# Patient Record
Sex: FEMALE | Race: WHITE | Employment: OTHER | RURAL
[De-identification: names, ages, dates, MRNs, and addresses within clinical notes are randomized per-mention and may not be internally consistent; named-entity substitution may affect disease eponyms.]

---

## 2020-05-18 PROBLEM — Z79.4 TYPE 2 DIABETES MELLITUS WITH HYPERGLYCEMIA, WITH LONG-TERM CURRENT USE OF INSULIN (HCC): Status: ACTIVE | Noted: 2020-05-18

## 2020-05-18 PROBLEM — E72.12 MTHFR (METHYLENE THF REDUCTASE) DEFICIENCY AND HOMOCYSTINURIA (HCC): Status: ACTIVE | Noted: 2020-05-18

## 2020-05-18 PROBLEM — M75.111 INCOMPLETE TEAR OF RIGHT ROTATOR CUFF: Status: ACTIVE | Noted: 2020-05-18

## 2020-05-18 PROBLEM — E72.11 MTHFR (METHYLENE THF REDUCTASE) DEFICIENCY AND HOMOCYSTINURIA (HCC): Status: ACTIVE | Noted: 2020-05-18

## 2020-05-18 PROBLEM — F51.01 PRIMARY INSOMNIA: Status: ACTIVE | Noted: 2020-05-18

## 2020-05-18 PROBLEM — E11.65 TYPE 2 DIABETES MELLITUS WITH HYPERGLYCEMIA, WITH LONG-TERM CURRENT USE OF INSULIN (HCC): Status: ACTIVE | Noted: 2020-05-18

## 2020-05-18 PROBLEM — E89.0 POSTOPERATIVE HYPOTHYROIDISM: Status: ACTIVE | Noted: 2020-05-18

## 2020-05-18 PROBLEM — Z86.711 HISTORY OF PULMONARY EMBOLISM: Chronic | Status: ACTIVE | Noted: 2020-05-18

## 2020-09-25 PROBLEM — R61 DIAPHORESIS: Status: ACTIVE | Noted: 2020-09-25

## 2020-09-25 PROBLEM — L98.9 SKIN LESION OF FACE: Status: ACTIVE | Noted: 2020-09-25

## 2020-09-25 PROBLEM — Z86.711 HISTORY OF PULMONARY EMBOLISM: Status: ACTIVE | Noted: 2020-09-25

## 2020-09-25 PROBLEM — J32.9 CHRONIC SINUSITIS: Status: ACTIVE | Noted: 2020-09-25

## 2020-10-01 PROBLEM — R80.9 MICROALBUMINURIA: Status: ACTIVE | Noted: 2020-10-01

## 2020-10-01 PROBLEM — E78.2 MIXED HYPERLIPIDEMIA: Status: ACTIVE | Noted: 2020-10-01

## 2020-12-17 ENCOUNTER — OFFICE VISIT (OUTPATIENT)
Dept: PRIMARY CARE CLINIC | Age: 65
End: 2020-12-17

## 2020-12-17 DIAGNOSIS — Z20.822 ENCOUNTER FOR LABORATORY TESTING FOR COVID-19 VIRUS: Primary | ICD-10-CM

## 2020-12-17 PROBLEM — E11.21 TYPE 2 DIABETES WITH NEPHROPATHY (HCC): Status: ACTIVE | Noted: 2020-12-17

## 2020-12-17 NOTE — PROGRESS NOTES
Pt presents to the flu clinic today requesting a covid test. Pt denies symptoms but needs to be screened prior to upcoming travel. Patient declined to be seen by a doctor today.  CANDI

## 2020-12-19 LAB — SARS-COV-2, NAA: NOT DETECTED

## 2021-01-27 PROBLEM — M50.30 DDD (DEGENERATIVE DISC DISEASE), CERVICAL: Status: ACTIVE | Noted: 2021-01-27

## 2021-02-03 PROBLEM — M85.859 OSTEOPENIA OF NECK OF FEMUR: Status: ACTIVE | Noted: 2021-02-03

## 2021-07-14 DIAGNOSIS — F51.01 PRIMARY INSOMNIA: ICD-10-CM

## 2021-07-14 RX ORDER — CYCLOBENZAPRINE HCL 10 MG
TABLET ORAL
Qty: 90 TABLET | Refills: 1 | Status: SHIPPED | OUTPATIENT
Start: 2021-07-14 | End: 2021-10-20

## 2021-07-14 RX ORDER — ZOLPIDEM TARTRATE 5 MG/1
5 TABLET ORAL
Qty: 30 TABLET | Refills: 0 | Status: SHIPPED | OUTPATIENT
Start: 2021-07-19 | End: 2021-08-14

## 2021-07-14 RX ORDER — ATORVASTATIN CALCIUM 40 MG/1
TABLET, FILM COATED ORAL
Qty: 90 TABLET | Refills: 3 | Status: SHIPPED | OUTPATIENT
Start: 2021-07-14

## 2021-07-14 RX ORDER — LEVOTHYROXINE SODIUM 112 UG/1
TABLET ORAL
Qty: 90 TABLET | Refills: 1 | Status: SHIPPED | OUTPATIENT
Start: 2021-07-14 | End: 2022-01-11

## 2021-07-14 NOTE — TELEPHONE ENCOUNTER
Pt has a appt on 7/29 but only has a weeks worth of meds remaining.  Could we call I enough to get her to her appt    [facility]    Levothyroxine 112  Zolpidem 5  Atorvastatin 40  Cyclobenzaprine 10

## 2021-07-14 NOTE — TELEPHONE ENCOUNTER
Meds refilled.  checked. Ambien last filled 4-22 for a 90 days supply.  New script sent for #[id number],[id number] with future fill date 7-19-21

## 2021-07-29 ENCOUNTER — OFFICE VISIT (OUTPATIENT)
Dept: FAMILY MEDICINE CLINIC | Age: 66
End: 2021-07-29
Payer: MEDICARE

## 2021-07-29 VITALS
SYSTOLIC BLOOD PRESSURE: 116 MMHG | OXYGEN SATURATION: 100 % | WEIGHT: 209.13 LBS | DIASTOLIC BLOOD PRESSURE: 69 MMHG | TEMPERATURE: 98.3 F | BODY MASS INDEX: 30.97 KG/M2 | RESPIRATION RATE: 18 BRPM | HEART RATE: 81 BPM | HEIGHT: 69 IN

## 2021-07-29 DIAGNOSIS — R10.30 LOWER ABDOMINAL PAIN: Primary | ICD-10-CM

## 2021-07-29 DIAGNOSIS — F51.04 PSYCHOPHYSIOLOGICAL INSOMNIA: ICD-10-CM

## 2021-07-29 DIAGNOSIS — E78.2 MIXED HYPERLIPIDEMIA: ICD-10-CM

## 2021-07-29 DIAGNOSIS — N30.00 ACUTE CYSTITIS WITHOUT HEMATURIA: ICD-10-CM

## 2021-07-29 DIAGNOSIS — E72.12 MTHFR (METHYLENE THF REDUCTASE) DEFICIENCY AND HOMOCYSTINURIA (HCC): ICD-10-CM

## 2021-07-29 DIAGNOSIS — M12.811 ROTATOR CUFF TEAR ARTHROPATHY OF BOTH SHOULDERS: ICD-10-CM

## 2021-07-29 DIAGNOSIS — E89.0 POSTOPERATIVE HYPOTHYROIDISM: ICD-10-CM

## 2021-07-29 DIAGNOSIS — M75.101 ROTATOR CUFF TEAR ARTHROPATHY OF BOTH SHOULDERS: ICD-10-CM

## 2021-07-29 DIAGNOSIS — E72.11 MTHFR (METHYLENE THF REDUCTASE) DEFICIENCY AND HOMOCYSTINURIA (HCC): ICD-10-CM

## 2021-07-29 DIAGNOSIS — M50.30 DDD (DEGENERATIVE DISC DISEASE), CERVICAL: ICD-10-CM

## 2021-07-29 DIAGNOSIS — Z91.199 NON COMPLIANCE WITH MEDICAL TREATMENT: ICD-10-CM

## 2021-07-29 DIAGNOSIS — Z12.11 SCREENING FOR COLON CANCER: ICD-10-CM

## 2021-07-29 DIAGNOSIS — M12.812 ROTATOR CUFF TEAR ARTHROPATHY OF BOTH SHOULDERS: ICD-10-CM

## 2021-07-29 DIAGNOSIS — M75.102 ROTATOR CUFF TEAR ARTHROPATHY OF BOTH SHOULDERS: ICD-10-CM

## 2021-07-29 DIAGNOSIS — E11.21 TYPE 2 DIABETES WITH NEPHROPATHY (HCC): ICD-10-CM

## 2021-07-29 LAB
BILIRUB UR QL STRIP: NEGATIVE
GLUCOSE UR-MCNC: NORMAL MG/DL
KETONES P FAST UR STRIP-MCNC: NORMAL MG/DL
PH UR STRIP: 5 [PH] (ref 4.6–8)
PROT UR QL STRIP: NORMAL
SP GR UR STRIP: 1.03 (ref 1–1.03)
UA UROBILINOGEN AMB POC: NORMAL (ref 0.2–1)
URINALYSIS CLARITY POC: CLEAR
URINALYSIS COLOR POC: YELLOW
URINE BLOOD POC: NEGATIVE
URINE LEUKOCYTES POC: NEGATIVE
URINE NITRITES POC: POSITIVE

## 2021-07-29 PROCEDURE — G8432 DEP SCR NOT DOC, RNG: HCPCS | Performed by: NURSE PRACTITIONER

## 2021-07-29 PROCEDURE — G8427 DOCREV CUR MEDS BY ELIG CLIN: HCPCS | Performed by: NURSE PRACTITIONER

## 2021-07-29 PROCEDURE — 3017F COLORECTAL CA SCREEN DOC REV: CPT | Performed by: NURSE PRACTITIONER

## 2021-07-29 PROCEDURE — 2022F DILAT RTA XM EVC RTNOPTHY: CPT | Performed by: NURSE PRACTITIONER

## 2021-07-29 PROCEDURE — G8536 NO DOC ELDER MAL SCRN: HCPCS | Performed by: NURSE PRACTITIONER

## 2021-07-29 PROCEDURE — 3046F HEMOGLOBIN A1C LEVEL >9.0%: CPT | Performed by: NURSE PRACTITIONER

## 2021-07-29 PROCEDURE — 1090F PRES/ABSN URINE INCON ASSESS: CPT | Performed by: NURSE PRACTITIONER

## 2021-07-29 PROCEDURE — G8399 PT W/DXA RESULTS DOCUMENT: HCPCS | Performed by: NURSE PRACTITIONER

## 2021-07-29 PROCEDURE — G9899 SCRN MAM PERF RSLTS DOC: HCPCS | Performed by: NURSE PRACTITIONER

## 2021-07-29 PROCEDURE — 1101F PT FALLS ASSESS-DOCD LE1/YR: CPT | Performed by: NURSE PRACTITIONER

## 2021-07-29 PROCEDURE — G8417 CALC BMI ABV UP PARAM F/U: HCPCS | Performed by: NURSE PRACTITIONER

## 2021-07-29 PROCEDURE — 36415 COLL VENOUS BLD VENIPUNCTURE: CPT | Performed by: NURSE PRACTITIONER

## 2021-07-29 PROCEDURE — 99214 OFFICE O/P EST MOD 30 MIN: CPT | Performed by: NURSE PRACTITIONER

## 2021-07-29 RX ORDER — FLASH GLUCOSE SENSOR
KIT MISCELLANEOUS
Qty: 1 KIT | Refills: 5 | Status: SHIPPED | OUTPATIENT
Start: 2021-07-29 | End: 2021-08-01 | Stop reason: ALTCHOICE

## 2021-07-29 RX ORDER — NITROFURANTOIN 25; 75 MG/1; MG/1
100 CAPSULE ORAL 2 TIMES DAILY
Qty: 14 CAPSULE | Refills: 0 | Status: SHIPPED | OUTPATIENT
Start: 2021-07-29 | End: 2021-08-05

## 2021-07-29 RX ORDER — INSULIN GLARGINE 100 [IU]/ML
INJECTION, SOLUTION SUBCUTANEOUS
Qty: 15 PEN | Refills: 2 | Status: SHIPPED | OUTPATIENT
Start: 2021-07-29 | End: 2021-08-10

## 2021-07-29 NOTE — PROGRESS NOTES
Subjective:     CC: diabetes, follow up    HPI  This is a 6 month follow up for diabetes and other chronic medical problems. She is a retired  from [place]. New issues: States she is having bilateral lower abdominal pain. Started a week or so ago. Denies dysuria, urinary frequency or urgency. Denies hematuria. Denies diarrhea or constipation. She believes she has ovarian cysts and would like an ultrasound. Type 2 diabetes  Lab Results   Component Value Date/Time    Hemoglobin A1c 8.9 (H) 09/24/2020 10:07 AM     She is on Basaglar insulin 46 units q HS. She also has insulin lispro prescribed to take TID qAC according to SS. States she has not had to use this much. Also on Metformin 1000mg BID. Januvia and Jardiance gave her horrible yeast infections. 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 At one time she brought her A1C down to 6% from 10% with the Keto diet. Unfortunately she fell off track with her diet. Says the "[employer] works great, it will alert her if her sugar drops- this happened only 2-3 times when she did not eat enough. Was referred to Endo months ago but never made an appt. Microalbuminuria  At the last visit her microalbuminuria was elevated so she was started on a low dose of Lisinopril. Creatinine was normal. She tolerates the Lisinopril well. BP at goal. She has no HTN. HLD  At her last appt 3 months ago her total cholesterol was 258, . She was instructed to increase Lipitor from 40mg to 80mg but it gave her leg cramps. She is now back on 40mg, still has leg cramps but if she drinks something they will go away. Acquired hypothyroidism  Lab Results   Component Value Date/Time    TSH 1.900 12/17/2020 10:29 AM     She had a thyroidectomy due to pre-cancerous cells after taking Victoza. Did not require chemo or radiation. Now on Synthroid 112mcg daily. Denies fatigue or weight changes.     Insomnia  Her previous provider had her on Ambien 5mg qHS prn which worked well but she was concerned about the long term effects. She tried trazodone but had side effects. Bernice Cleary was not covered by insurance so she is now back on Ambien. Sometimes she will skip it and use Benadryl instead. Today we discussed trying Belsomra instead but she does not want to try anything else right now. H/O PE  She had a PE x 4 over 20 years ago. Now on baby aspirin daily per recommendation of the hematologist she saw in [place]. Was worked up for [address] disease but tested negative. Saw a cardiologist for her PE's - Had a normal stress test 3 years ago. 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 Common symptoms include:  Low muscle tone (35%)   Seizures (33%)   Failure to thrive (17%)   Blood vessel disease (16%) (blood clots)   Small head size (15%)   Ataxia (9%)   Peripheral neuropathy (7%)     Other possible symptoms include bone disease (scoliosis), mental health problems, and behavior problems (e.g., attention deficit disorder and hyperactivity). Life expectancy will vary depending on the severity of the deficiency  She has seen a Hematologist in the past. 3 of her children also have it, 1 out of 3 has [address] She is not on any Vitamin B or folic acid. She was referred to hematologist Dr Boris Schuster at a previous visit but never made an appt. Bilateral arm pain r/t cervical DDD  Pain started almost a year ago. Pain located in the dorsal surface of her forearms and upper arms. They feel sore as if someone has hit them with a bat. Pain at times can be rated a 9/10. Worse when laying down in bed and in the mornings when she wakes up. Some mornings she can't use her arms and can't lift her arms due to pain. She has had this pain before, back in 2018.  She had a nerve conduction study in 6/2019. Per pt the doctor who conducted the test felt she had severe carpal tunnel and suggested MRI for shoulders. Per pt MRI of both shoulders showed torn rotator cuffs bilaterally. Went to PT which helped a bit. She then started the Keto diet which helped a lot so she never followed up with the specialists. When she came off Keto the pain came back. A cervical spinal xray was ordered. It showed \"intervertebral disc space narrowing at the C5-6 and the C6-7 levels. There is evidence of neural foraminal encroachment at the C4-5 and C5-6 levels bilaterally. She was then referred to spine specialist Dr Randall Sanchez but did not go. Instead she saw orthopedist Dr Nish Okeefe at [facility]. Per pt he informed her that many patients her age have partially torn rotator cuffs that do not cause this much pain. He told she may have \"diabetic frozen shoulder\" and recommended she do PT, but she never did. States she has \"been there, done that\" and she knows what exercises she needs to do. Healthcare maintenance  Mammogram done 2/2021- normal  DEXA done 2/2021- showed osteopenia of the thigh and wrists- on Vit D supp   PAP- done 12/2020- normal  Colonoscopy- she had one about 12-15 years ago, overdue for repeat.  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 (HCC) E72.12, E72.11    Skin lesion of face L98.9    History of pulmonary embolism Z86.711    Chronic sinusitis J32.9    Diaphoresis R61    Microalbuminuria R80.9    Mixed hyperlipidemia E78.2    Type 2 diabetes with nephropathy (HCC) E11.21    DDD (degenerative disc disease), cervical M50.30    Osteopenia of neck of femur M85.859       Past Medical History:   Diagnosis Date    Diabetes ([address]     Double uterus     History of pulmonary embolus (PE) 1990's    x4- due to MTHFR deficiency    Menopause     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  Max Daily Amount: 5 mg., Disp: 30 Tablet, Rfl: 0    atorvastatin (LIPITOR) 40 mg tablet, TAKE 1 TABLET BY MOUTH EVERY DAY, Disp: 90 Tablet, Rfl: 3    cyclobenzaprine (FLEXERIL) 10 mg tablet, TAKE 1 TABLET BY MOUTH THREE (3) TIMES DAILY AS NEEDED FOR MUSCLE SPASM(S)., Disp: 90 Tablet, Rfl: 1    metFORMIN (GLUCOPHAGE) 1,000 mg tablet, TAKE 1 TABLET BY MOUTH TWICE A DAY WITH MEALS, Disp: 60 Tab, Rfl: 0    lisinopriL (PRINIVIL, ZESTRIL) 2.5 mg tablet, TAKE 1 TABLET BY MOUTH EVERY DAY, Disp: 90 Tab, Rfl: 0    insulin glargine (Lantus Solostar U-100 Insulin) 100 unit/mL (3 mL) inpn, INJECT 44 UNITS SUBCUTENEOUS AT BEDTIME, Disp: 15 Pen, Rfl: 2    glucose blood VI test strips (ASCENSIA AUTODISC VI, ONE TOUCH ULTRA TEST VI) strip, Check blood sugar BID DX E11.9, Disp: 100 Strip, Rfl: 11    lancets (OneTouch UltraSoft Lancets) misc, Check blood sugar bid DX E11.9, Disp: 100 Each, Rfl: 11    ibuprofen (MOTRIN) 400 mg tablet, Take 1 Tab by mouth two (2) times daily as needed for Pain., Disp: 60 Tab, Rfl: 0    flash glucose sensor (FreeStyle Meredith 14 Day Sensor) kit, 1 (ONE) EACH EVERY 14 DAYS, E11.9, Disp: 1 Kit, Rfl: 5    Blood-Glucose Meter monitoring kit, Check blood suagr before meals and at bedtime. E11.9  Needs One touch brand, Disp: 1 Kit, Rfl: 0    insulin lispro (HumaLOG KwikPen Insulin) 100 unit/mL kwikpen, INJECT 3 TIMES A DAY 10 TO 15 MINUTES BEFORE MEALS,ACCORDING TO SLIDING SCALE, Disp: 3 Package, Rfl: 1    aspirin 81 mg chewable tablet, Take  by mouth., Disp: , Rfl:     Insulin Needles, Disposable, 32 gauge x 1/4\" ndle, 1 (one) Injection as directed, to michael with levemir pen for 0 days, Disp: , Rfl:     cholecalciferol (VITAMIN D3) (5000 Units/125 mcg) tab tablet, take one tablet by oral route once a day, Disp: , Rfl:     No Known Allergies    Past Surgical History:   Procedure Laterality Date    HX APPENDECTOMY      HX BACK SURGERY      heniated disc    HX  SECTION      x2    HX DILATION AND CURETTAGE      HX THYROIDECTOMY      HX TUBAL LIGATION         Social History     Tobacco Use   Smoking Status Former Smoker   Smokeless Tobacco Never Used       Social History     Socioeconomic History    Marital status:      Spouse name: Not on file    Number of children: Not on file    Years of education: Not on file    Highest education level: Not on file   Tobacco Use    Smoking status: Former Smoker    Smokeless tobacco: Never Used   Substance and Sexual Activity    Alcohol use: Yes    Drug use: Never     Social Determinants of Health     Financial Resource Strain:     Difficulty of Paying Living Expenses:    Food Insecurity:     Worried About Running Out of Food in the Last Year:     [address] in the Last Year:    Transportation Needs:     Lack of Transportation (Medical):      Lack of Transportation (Non-Medical):    Physical Activity:     Days of Exercise per Week:     Minutes of Exercise per Session:    Stress:     Feeling of Stress :    Social Connections:     Frequency of Communication with Friends and Family:     Frequency of Social Gatherings with Friends and Family:     Attends Faith Services:     Active Member of Clubs or Organizations:  Attends Club or Organization Meetings:     Marital Status:        No family history on file. ROS:  Gen: denies fever, chills, or fatigue  HEENT:denies H/A, nasal congestion, or sore throat  Denies nasal congestion, runny nose, ear pain, or sore throat  Resp: denies dyspnea, cough, no wheezing  CV: denies chest pain, pressure, or palpitations  Extremeties: denies edema  GI[de-identified] +bilateral lower abdominal pain, denies nausea, vomiting, diarrhea, or constipation. Denies bloody or black stools  : Denies dysuria, hematuria, urinary frequency or urgency  Musculoskeletal:+ chronic pain in both shoulders  Neuro: +mild occasional numbness/tingling in feet, +occasional vertigo. No tremors or seizures   Skin: denies rashes or new lesions  Psych: +insomnia- well controlled with Ambien, denies anxiety, depression, giovany, or other changes in mood      Objective:     Visit Vitals  /69 (BP 1 Location: Left arm, BP Patient Position: Sitting)   Pulse 81   Temp 98.3 °F (36.8 °C) (Temporal)   Resp 18   Ht 5' 9\" (1.753 m)   Wt 209 lb 2 oz (94.9 kg)   SpO2 100%   BMI 30.88 kg/m²       General: Alert and oriented. No acute distress. Well nourished. HEENT :  Eyes: Sclera white, conjunctiva clear. PERRLA. Extra ocular movements intact. Neck: Supple with FROM. No TTP  Lungs: Breathing even and unlabored. All lobes clear to auscultation bilaterally   Heart :RRR, S1 and S2 normal intensity, no extra heart sounds  Extremities: Non-edematous, no pallor or cyanosis. Bilat. radial and pedal pulses 2+   Musculo: Shoulders: No TTP, joint swelling, erythema, or edema. Neuro: Cranial nerves grossly normal. Sensation intact in both arms and hands  Psych: Mood and thought content appropriate for situation. Dressed appropriately and with good hygiene.   Skin: warm dry and intact      Assessment & Plan:     Type 2 diabetes  Recheck A1C- she will RTO another day when she is fasting since she needs cholesterol rechecked as well  Cont current meds  Work on diabetic diet  Cont to check BS daily with [employer] system- sensors refilled  Cont ACEi  Cont statin  Exercise  F/U 3 months    HLD  Recheck lipid panel- she will RTO another day when she is fasting   Cont Lipitor 40mg daily- this is the max dose she can tolerate  Work on low fat diet  Go to ER for CP or SOB  F/U 3 months    Acquired hypothyroidism  Recheck TSH  Cont Synthroid 112mcg daily. F/U 3 months    Insomnia  Discussed risks of long term use of Ambien- she verbalized understanding   Encouraged pt to try Belsomra but she declines  Cont Ambien qHS - no refills needed at this time  F/U 3 months    Bilateral arm pain r/t cervical DDD  She was referred to spine specialist Dr Manuel Keys at last appt but did not go  Went to ortho instead who recommended PT  She is not interested in PT  Cont Cyclobenzaprine 10mg TID prn   F/U 3 months    H/O PE  Cont baby aspirin daily  She was referred to [facility] Dr Remigio Potts at previous visit but did not go  Notify provider or go to ER for CP, SOB, dizziness, leg pain/swelling, or other concerns. 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 Healthcare maintenance  Mammogram done 2/2021- normal  DEXA done 2/2021- showed osteopenia of the thigh and wrists- on Vit D supp   PAP- done 12/2020- normal  Colonoscopy- she had one about 12-15 years ago, overdue for repeat.  Referred to  Dr Segundo Gutierrez today  Eye exam- she was referred to Dr Yadira Banuelos at the last appt  PNA vaccines- Prevnar 13 given 12/2020, due for Pneumovax 23 in 12/2021  Covid vaccine- she has had both Moderna vaccines  Shingrix- not addressed today        Verbal and written instructions (see AVS) provided.  Patient expresses understanding of diagnosis and treatment plan. Health Maintenance Due   Topic Date Due    Eye Exam Retinal or Dilated  Never done    DTaP/Tdap/Td series (1 - Tdap) Never done    Colorectal Cancer Screening Combo  Never done    Shingrix Vaccine Age 50> (1 of 2) Never done               Marietta Caballero, NP

## 2021-07-29 NOTE — PROGRESS NOTES
1. Have you been to the ER, urgent care clinic since your last visit? Hospitalized since your last visit? No    2. Have you seen or consulted any other health care providers outside of the [address] [place] [address] since your last visit? Include any pap smears or colon screening.  No     Chief Complaint   Patient presents with    Abdominal Pain     X 1 week    Thyroid Problem    Diabetes    Cholesterol Problem     Visit Vitals  /69 (BP 1 Location: Left arm, BP Patient Position: Sitting)   Pulse 81   Temp 98.3 °F (36.8 °C) (Temporal)   Resp 18   Ht 5' 9\" (1.753 m)   Wt 209 lb 2 oz (94.9 kg)   SpO2 100%   BMI 30.88 kg/m²

## 2021-07-30 ENCOUNTER — DOCUMENTATION ONLY (OUTPATIENT)
Dept: FAMILY MEDICINE CLINIC | Age: 66
End: 2021-07-30

## 2021-07-30 ENCOUNTER — TELEPHONE (OUTPATIENT)
Dept: FAMILY MEDICINE CLINIC | Age: 66
End: 2021-07-30

## 2021-08-01 LAB
BACTERIA SPEC CULT: ABNORMAL
CC UR VC: ABNORMAL
SERVICE CMNT-IMP: ABNORMAL

## 2021-08-01 RX ORDER — FLASH GLUCOSE SENSOR
KIT MISCELLANEOUS
Qty: 1 KIT | Refills: 5 | Status: SHIPPED | OUTPATIENT
Start: 2021-08-01

## 2021-08-02 NOTE — PROGRESS NOTES
Urine culture shows bacteria may not be susceptible to Macrobid I gave her- is she still having symptoms?  If so I need to switch antibiotic

## 2021-08-02 NOTE — PROGRESS NOTES
Pt aware 2 identifiers verified name and      She said she is currently not having any pain of symptoms

## 2021-08-04 ENCOUNTER — LAB ONLY (OUTPATIENT)
Dept: FAMILY MEDICINE CLINIC | Age: 66
End: 2021-08-04
Payer: MEDICARE

## 2021-08-04 DIAGNOSIS — E89.0 POSTOPERATIVE HYPOTHYROIDISM: ICD-10-CM
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
## 2021-08-04 DIAGNOSIS — E78.2 MIXED HYPERLIPIDEMIA: ICD-10-CM

## 2021-08-04 DIAGNOSIS — E11.21 TYPE 2 DIABETES WITH NEPHROPATHY (HCC): ICD-10-CM

## 2021-08-04 PROCEDURE — 36415 COLL VENOUS BLD VENIPUNCTURE: CPT | Performed by: NURSE PRACTITIONER

## 2021-08-05 LAB
ALBUMIN SERPL-MCNC: 4 G/DL (ref 3.5–5)
ALBUMIN SERPL-MCNC: 4.1 G/DL (ref 3.5–5)
ALBUMIN/GLOB SERPL: 1.3 {RATIO} (ref 1.1–2.2)
ALBUMIN/GLOB SERPL: 1.4 {RATIO} (ref 1.1–2.2)
ALP SERPL-CCNC: 85 U/L (ref 45–117)
ALP SERPL-CCNC: 90 U/L (ref 45–117)
ALT SERPL-CCNC: 67 U/L (ref 12–78)
ALT SERPL-CCNC: 69 U/L (ref 12–78)
ANION GAP SERPL CALC-SCNC: 6 MMOL/L (ref 5–15)
ANION GAP SERPL CALC-SCNC: 7 MMOL/L (ref 5–15)
AST SERPL-CCNC: 33 U/L (ref 15–37)
AST SERPL-CCNC: 34 U/L (ref 15–37)
BASOPHILS # BLD: 0.1 K/UL (ref 0–0.1)
BASOPHILS NFR BLD: 1 % (ref 0–1)
BILIRUB SERPL-MCNC: 0.6 MG/DL (ref 0.2–1)
BILIRUB SERPL-MCNC: 0.6 MG/DL (ref 0.2–1)
BUN SERPL-MCNC: 12 MG/DL (ref 6–20)
BUN SERPL-MCNC: 12 MG/DL (ref 6–20)
BUN/CREAT SERPL: 17 (ref 12–20)
BUN/CREAT SERPL: 17 (ref 12–20)
CALCIUM SERPL-MCNC: 9.2 MG/DL (ref 8.5–10.1)
CALCIUM SERPL-MCNC: 9.3 MG/DL (ref 8.5–10.1)
CHLORIDE SERPL-SCNC: 100 MMOL/L (ref 97–108)
CHLORIDE SERPL-SCNC: 101 MMOL/L (ref 97–108)
CHOLEST SERPL-MCNC: 156 MG/DL
CHOLEST SERPL-MCNC: 161 MG/DL
CO2 SERPL-SCNC: 29 MMOL/L (ref 21–32)
CO2 SERPL-SCNC: 32 MMOL/L (ref 21–32)
CREAT SERPL-MCNC: 0.69 MG/DL (ref 0.55–1.02)
CREAT SERPL-MCNC: 0.7 MG/DL (ref 0.55–1.02)
DIFFERENTIAL METHOD BLD: ABNORMAL
EOSINOPHIL # BLD: 0.2 K/UL (ref 0–0.4)
EOSINOPHIL NFR BLD: 4 % (ref 0–7)
ERYTHROCYTE [DISTWIDTH] IN BLOOD BY AUTOMATED COUNT: 12.1 % (ref 11.5–14.5)
EST. AVERAGE GLUCOSE BLD GHB EST-MCNC: 243 MG/DL
GLOBULIN SER CALC-MCNC: 2.9 G/DL (ref 2–4)
GLOBULIN SER CALC-MCNC: 3.2 G/DL (ref 2–4)
GLUCOSE SERPL-MCNC: 208 MG/DL (ref 65–100)
GLUCOSE SERPL-MCNC: 208 MG/DL (ref 65–100)
HBA1C MFR BLD: 10.1 % (ref 4–5.6)
HCT VFR BLD AUTO: 42.8 % (ref 35–47)
HDLC SERPL-MCNC: 46 MG/DL
HDLC SERPL-MCNC: 52 MG/DL
HDLC SERPL: 3 {RATIO} (ref 0–5)
HDLC SERPL: 3.5 {RATIO} (ref 0–5)
HGB BLD-MCNC: 14.2 G/DL (ref 11.5–16)
IMM GRANULOCYTES # BLD AUTO: 0 K/UL (ref 0–0.04)
IMM GRANULOCYTES NFR BLD AUTO: 1 % (ref 0–0.5)
LDLC SERPL CALC-MCNC: 74.2 MG/DL (ref 0–100)
LDLC SERPL CALC-MCNC: 83.8 MG/DL (ref 0–100)
LYMPHOCYTES # BLD: 1.9 K/UL (ref 0.8–3.5)
LYMPHOCYTES NFR BLD: 32 % (ref 12–49)
MCH RBC QN AUTO: 29.3 PG (ref 26–34)
MCHC RBC AUTO-ENTMCNC: 33.2 G/DL (ref 30–36.5)
MCV RBC AUTO: 88.4 FL (ref 80–99)
MONOCYTES # BLD: 0.4 K/UL (ref 0–1)
MONOCYTES NFR BLD: 7 % (ref 5–13)
NEUTS SEG # BLD: 3.3 K/UL (ref 1.8–8)
NEUTS SEG NFR BLD: 55 % (ref 32–75)
NRBC # BLD: 0 K/UL (ref 0–0.01)
NRBC BLD-RTO: 0 PER 100 WBC
PLATELET # BLD AUTO: 227 K/UL (ref 150–400)
PMV BLD AUTO: 10.1 FL (ref 8.9–12.9)
POTASSIUM SERPL-SCNC: 3.8 MMOL/L (ref 3.5–5.1)
POTASSIUM SERPL-SCNC: 3.8 MMOL/L (ref 3.5–5.1)
PROT SERPL-MCNC: 7 G/DL (ref 6.4–8.2)
PROT SERPL-MCNC: 7.2 G/DL (ref 6.4–8.2)
RBC # BLD AUTO: 4.84 M/UL (ref 3.8–5.2)
SODIUM SERPL-SCNC: 137 MMOL/L (ref 136–145)
SODIUM SERPL-SCNC: 138 MMOL/L (ref 136–145)
TRIGL SERPL-MCNC: 149 MG/DL (ref ?–150)
TRIGL SERPL-MCNC: 156 MG/DL (ref ?–150)
TSH SERPL DL<=0.05 MIU/L-ACNC: 8.9 UIU/ML (ref 0.36–3.74)
VLDLC SERPL CALC-MCNC: 29.8 MG/DL
VLDLC SERPL CALC-MCNC: 31.2 MG/DL
WBC # BLD AUTO: 5.9 K/UL (ref 3.6–11)

## 2021-08-05 NOTE — PROGRESS NOTES
Cholesterol looks good. TSH is out of range, has she been taking her thyroid pill consistently? If so we need to increase dose and recheck 3 months. A1C up to 10.1, very poorly controlled. She is at risk for stroke and heart attack. Recommend adding pill called Invokana. Will send script to pharmacy.  May cause yeast infection she can stop it if this occurs

## 2021-08-05 NOTE — PROGRESS NOTES
Patient identified by two patient identifiers, name and date of birth. Spoke with patient. Patient aware of results and states understanding at this time. Patient states that she was without her thyroid med for 4-5 days the week prior to her labs being drawn but she has been consistently taking it otherwise and she has also noticed her hair falling out more so recently as well. Patient also states that she has tried in the past about 4 years ago the invokana and she had to stop due to yeast infections so she would like to try something else.

## 2021-08-06 LAB — B BURGDOR IGG+IGM SER-ACNC: <0.91 ISR (ref 0–0.9)

## 2021-08-09 DIAGNOSIS — N30.90 CYSTITIS: Primary | ICD-10-CM

## 2021-08-09 DIAGNOSIS — E11.21 TYPE 2 DIABETES WITH NEPHROPATHY (HCC): ICD-10-CM

## 2021-08-09 DIAGNOSIS — B37.31 CANDIDA VAGINITIS: ICD-10-CM

## 2021-08-09 RX ORDER — FLUCONAZOLE 150 MG/1
150 TABLET ORAL DAILY
Qty: 1 TABLET | Refills: 0 | Status: SHIPPED | OUTPATIENT
Start: 2021-08-09 | End: 2021-08-30

## 2021-08-09 RX ORDER — SULFAMETHOXAZOLE AND TRIMETHOPRIM 800; 160 MG/1; MG/1
1 TABLET ORAL 2 TIMES DAILY
Qty: 20 TABLET | Refills: 0 | Status: SHIPPED | OUTPATIENT
Start: 2021-08-09 | End: 2021-08-19

## 2021-08-09 NOTE — PROGRESS NOTES
Pt aware 2 identifiers verified name and      She says she thinks the antibiotic gave her a yeast infection was wondering if you could send in something for [facility] to [facility] and she said she didn't know if you wanted to order a scan because she is still having pain in ovaries

## 2021-08-09 NOTE — PROGRESS NOTES
The bacteria in her urine was probably not susceptible to the antibiotic she was taking. I have sent in another antibiotic for her to try along with the pill for the yeast infection.  If she is still having pain after completing the 2nd antibiotic we will check the ovaries

## 2021-08-10 RX ORDER — INSULIN GLARGINE 100 [IU]/ML
INJECTION, SOLUTION SUBCUTANEOUS
Qty: 15 PEN | Refills: 0 | Status: SHIPPED | OUTPATIENT
Start: 2021-08-10 | End: 2021-11-23

## 2021-08-13 DIAGNOSIS — F51.01 PRIMARY INSOMNIA: ICD-10-CM

## 2021-08-14 RX ORDER — ZOLPIDEM TARTRATE 5 MG/1
5 TABLET ORAL
Qty: 90 TABLET | Refills: 1 | Status: SHIPPED | OUTPATIENT
Start: 2021-08-14 | End: 2021-11-02 | Stop reason: SDUPTHER

## 2021-09-13 ENCOUNTER — VIRTUAL VISIT (OUTPATIENT)
Dept: FAMILY MEDICINE CLINIC | Age: 66
End: 2021-09-13
Payer: MEDICARE

## 2021-09-13 DIAGNOSIS — J01.81 OTHER ACUTE RECURRENT SINUSITIS: Primary | ICD-10-CM

## 2021-09-13 PROCEDURE — G2025 DIS SITE TELE SVCS RHC/FQHC: HCPCS | Performed by: NURSE PRACTITIONER

## 2021-09-13 RX ORDER — CETIRIZINE HCL 10 MG
10 TABLET ORAL DAILY
Qty: 30 TABLET | Refills: 0 | Status: SHIPPED | OUTPATIENT
Start: 2021-09-13 | End: 2021-10-05

## 2021-09-13 RX ORDER — AZITHROMYCIN 250 MG/1
TABLET, FILM COATED ORAL
Qty: 6 TABLET | Refills: 0 | Status: SHIPPED | OUTPATIENT
Start: 2021-09-13 | End: 2021-09-18

## 2021-09-13 NOTE — PROGRESS NOTES
Dae Gallegos is a 77 y.o. female patient of [employer] evaluated via telephone on 9/13/2021. Consent:  She and/or health care decision maker is aware that that she may receive a bill for this telephone service, depending on her insurance coverage, and has provided verbal consent to proceed: Yes      New issues:   Chief Complaint   Patient presents with    Sinus Infection     Chronic     Cough     Chronic Sinusitis/cough  Reports 3 days of worsening sinus pressure around her face. States she vomited yesterday due to all her phlegm. Phlegm is clear. Mom and  are both sick with same s/s. Mom and  tested for covid with negative results. Patient denies fevers at home, but is concerned she is developing an acute sinus infection. She has had a sinus infection in the past and believes she is allergic to her cat. Assessment/Plan  Acute Sinusitis  -Educated her to 'watch and wait.' If her symptoms do no improve with cetirizine and home symptom management in the next few days then recommend starting the Z-pack. Patient verbalized understanding.   -Start taking cetirizine  -Push fluids  -Take OTC tylenol and ibuprofen for pain  -Avoid allergens or allergy triggers. - Shower at the end of the day and sleep with windows closed. -Contact healthcare provider if you develop worsening symptoms, SOB, wheezing, H/A, fever, facial pain or swelling. Educated patient to receive covid testing at [facility] if symptoms do not improve, verbalized understanding. Documentation:  I communicated with the patient and/or health care decision maker about acute sinusitis. Details of this discussion including any medical advice provided: See above      I affirm this is a Patient Initiated Episode with an Established Patient who has not had a related appointment within my department in the past 7 days or scheduled within the next 24 hours.     Total Time: minutes: 11-20 minutes    Note: not billable if this call serves to triage the patient into an appointment for the relevant concern      Arjun Jones NP

## 2021-09-13 NOTE — PROGRESS NOTES
Learning Assessment 5/18/2020   PRIMARY LEARNER Patient   HIGHEST LEVEL OF EDUCATION - PRIMARY LEARNER  4 YEARS OF COLLEGE   PRIMARY LANGUAGE ENGLISH   LEARNER PREFERENCE PRIMARY DEMONSTRATION   ANSWERED BY mor dominguez   RELATIONSHIP SELF       1. Have you been to the ER, urgent care clinic since your last visit? Hospitalized since your last visit? No    2. Have you seen or consulted any other health care providers outside of the [address] [place] [address] since your last visit? Include any pap smears or colon screening.  No

## 2021-09-20 ENCOUNTER — VIRTUAL VISIT (OUTPATIENT)
Dept: FAMILY MEDICINE CLINIC | Age: 66
End: 2021-09-20
Payer: MEDICARE

## 2021-09-20 ENCOUNTER — TELEPHONE (OUTPATIENT)
Dept: FAMILY MEDICINE CLINIC | Age: 66
End: 2021-09-20

## 2021-09-20 DIAGNOSIS — B37.31 CANDIDA VAGINITIS: ICD-10-CM

## 2021-09-20 DIAGNOSIS — J01.00 ACUTE MAXILLARY SINUSITIS, RECURRENCE NOT SPECIFIED: Primary | ICD-10-CM

## 2021-09-20 PROCEDURE — G2025 DIS SITE TELE SVCS RHC/FQHC: HCPCS | Performed by: NURSE PRACTITIONER

## 2021-09-20 RX ORDER — CEFDINIR 300 MG/1
300 CAPSULE ORAL 2 TIMES DAILY
Qty: 20 CAPSULE | Refills: 0 | Status: SHIPPED | OUTPATIENT
Start: 2021-09-20 | End: 2021-09-30

## 2021-09-20 NOTE — PROGRESS NOTES
Michela Real is a 77 y.o. female patient of [facility] evaluated via telephone on 9/20/2021. Consent:  She and/or health care decision maker is aware that that she may receive a bill for this telephone service, depending on her insurance coverage, and has provided verbal consent to proceed: Yes      New issues:   Chief Complaint   Patient presents with    Cold Symptoms     Chronic Sinusitis/cough  She was seen on 9/13/21 via a virtual appt and diagnosed with acute sinusitis. Prescribed a z-pack, completed z-pack on 9/18/21. Today she states her mucus is green and she had some blood tinged mucus on the tissue when she blew her nose. Also having sinus pressure with ear fullness. Her mom and  have also been sick, but they are starting to feel better. Patient denies chills at home. She does not have a thermometer to take her temperature. Reports she is possible allergic to her cat. Will do allergy testing once she feels better    Assessment/Plan  Acute Sinusitis  -Start taking cefdinir  -Cont taking cetirizine  -Push fluids  -Take OTC tylenol and ibuprofen for pain  -Avoid allergens or allergy triggers. - Shower at the end of the day and sleep with windows closed. -Contact healthcare provider if you develop worsening symptoms, SOB, wheezing, H/A, fever, facial pain or swelling. Documentation:  I communicated with the patient and/or health care decision maker about acute sinusitis. Details of this discussion including any medical advice provided: See above      I affirm this is a Patient Initiated Episode with an Established Patient who has not had a related appointment within my department in the past 7 days or scheduled within the next 24 hours.     Total Time: minutes: 11-20 minutes    Note: not billable if this call serves to triage the patient into an appointment for the relevant concern      Jakob Brody NP

## 2021-09-20 NOTE — PROGRESS NOTES
1. Have you been to the ER, urgent care clinic since your last visit? Hospitalized since your last visit? No    2. Have you seen or consulted any other health care providers outside of the [address] [place] [address] since your last visit? Include any pap smears or colon screening.  No

## 2021-10-01 ENCOUNTER — TELEPHONE (OUTPATIENT)
Dept: FAMILY MEDICINE CLINIC | Age: 66
End: 2021-10-01

## 2021-10-03 RX ORDER — FLUCONAZOLE 150 MG/1
TABLET ORAL
Qty: 1 TABLET | Refills: 0 | Status: SHIPPED | OUTPATIENT
Start: 2021-10-03 | End: 2021-11-02 | Stop reason: SDUPTHER

## 2021-10-05 RX ORDER — CETIRIZINE HCL 10 MG
TABLET ORAL
Qty: 30 TABLET | Refills: 0 | Status: SHIPPED | OUTPATIENT
Start: 2021-10-05

## 2021-10-20 RX ORDER — CYCLOBENZAPRINE HCL 10 MG
TABLET ORAL
Qty: 90 TABLET | Refills: 1 | Status: SHIPPED | OUTPATIENT
Start: 2021-10-20

## 2021-10-29 NOTE — PROGRESS NOTES
Subjective:     CC: 3 month follow-up and AWV      HPI  This is a 3 month follow up for diabetes and other chronic medical problems. She is a retired  from [place]. Type 2 diabetes  Lab Results   Component Value Date/Time    Hemoglobin A1c 10.1 (H) 08/04/2021 08:54 PM     She is on Basaglar insulin 46 units q HS. She also has insulin lispro prescribed to take TID qAC according to SS. States she has not had to use this much. Also on Metformin 1000mg BID. Januvia and Jardiance gave her horrible yeast infections. 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 At one time she brought her A1C down to 6% from 10% with the Keto diet. Unfortunately she fell off track with her diet. Says the Chiasma works great, it will alert her if her sugar drops. Today, she states her blood sugars have been dropping into the 50's over night. She decided to stop taking the Basaglar insulin 46U qhs and continue the sliding scale insulin. She recently started a fasting diet where she only eats for eight hours out of the day and she believes this is why her blood sugar is dropping in the middle of the night. She is requesting to stop the Basaglar insulin 46Units while doing the low carb diet with fasting and she will follow-up in the office in 3 weeks. Will also check labs in the office today. Of note, she was referred to Endo months ago but never made an appt. Microalbuminuria  At the last visit her microalbuminuria was elevated so she was started on a low dose of Lisinopril. Creatinine was normal. She tolerates the Lisinopril well. BP at goal. She has no HTN. HLD  At her last appt 3 months ago her total cholesterol was 258, . She was instructed to increase Lipitor from 40mg to 80mg but it gave her leg cramps. She is now back on 40mg, still has leg cramps but if she drinks something they will go away.        Acquired hypothyroidism  Lab Results Component Value Date/Time    TSH 8.90 (H) 08/04/2021 08:54 PM     She had a thyroidectomy due to pre-cancerous cells after taking Victoza. Did not require chemo or radiation. Now on Synthroid 112mcg daily. Denies fatigue or weight changes. She forgot to take her synthroid for the week prior to get her labs done in 8/4/21. Will recheck her TSH today. Insomnia  Her previous provider had her on Ambien 5mg qHS prn which worked well but she was concerned about the long term effects. She tried trazodone but had side effects. Valentin Vail was not covered by insurance so she is now back on Ambien. Sometimes she will skip it and use Benadryl instead. Today we discussed trying Belsomra instead but she does not want to try anything else right now. H/O PE  She had a PE x 4 over 20 years ago. Now on baby aspirin daily per recommendation of the hematologist she saw in [place]. Was worked up for [address] disease but tested negative. Saw a cardiologist for her PE's - Had a normal stress test 3 years ago. 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 Common symptoms include:  Low muscle tone (35%)   Seizures (33%)   Failure to thrive (17%)   Blood vessel disease (16%) (blood clots)   Small head size (15%)   Ataxia (9%)   Peripheral neuropathy (7%)     Other possible symptoms include bone disease (scoliosis), mental health problems, and behavior problems (e.g., attention deficit disorder and hyperactivity). Life expectancy will vary depending on the severity of the deficiency  She has seen a Hematologist in the past. 3 of her children also have it, 1 out of 3 has [address] She is not on any Vitamin B or folic acid. She was referred to hematologist Dr Caryn Aschoff at a previous visit but never made an appt.      Bilateral arm pain r/t cervical DDD  Pain started almost a year ago. Pain located in the dorsal surface of her forearms and upper arms. They feel sore as if someone has hit them with a bat. Pain at times can be rated a 9/10. Worse when laying down in bed and in the mornings when she wakes up. Some mornings she can't use her arms and can't lift her arms due to pain. She has had this pain before, back in 2018. She had a nerve conduction study in 6/2019. Per pt the doctor who conducted the test felt she had severe carpal tunnel and suggested MRI for shoulders. Per pt MRI of both shoulders showed torn rotator cuffs bilaterally. Went to PT which helped a bit. She then started the Keto diet which helped a lot so she never followed up with the specialists. When she came off Keto the pain came back. A cervical spinal xray was ordered. It showed \"intervertebral disc space narrowing at the C5-6 and the C6-7 levels. There is evidence of neural foraminal encroachment at the C4-5 and C5-6 levels bilaterally. She was then referred to spine specialist Dr Julian Mitchell but did not go. Instead she saw orthopedist Dr Key Gastelum at [facility]. Per pt he informed her that many patients her age have partially torn rotator cuffs that do not cause this much pain. He told she may have \"diabetic frozen shoulder\" and recommended she do PT, but she never did. States she has \"been there, done that\" and she knows what exercises she needs to do. Postnasal drip  She reports allergies ever since she moved to [facility]. States she has a postnasal drip and is interested in taking something to help with her allergy symptoms. Healthcare maintenance  Mammogram done 2/2021- normal  DEXA done 2/2021- showed osteopenia of the thigh and wrists- on Vit D supp   PAP- done 12/2020- normal  Colonoscopy- she had one about 12-15 years ago, overdue for repeat.  Referred to  Dr Toni Jones today  Eye exam- she was referred to Dr Luis Mcdermott at the last appt  PNA vaccines- Prevnar 13 given 12/2020, due for Pneumovax 23 in 12/2021  Covid vaccine- she has had both Moderna vaccines  Shingrix- not addressed today    Patient Active Problem List   Diagnosis Code    Type 2 diabetes mellitus with hyperglycemia, with long-term current use of insulin (HCC) E11.65, Z79.4    Postoperative hypothyroidism E89.0    Primary insomnia F51.01    Incomplete tear of right rotator cuff M75.111    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 (HCC) E72.12, E72.11    Skin lesion of face L98.9    History of pulmonary embolism Z86.711    Chronic sinusitis J32.9    Diaphoresis R61    Microalbuminuria R80.9    Mixed hyperlipidemia E78.2    Type 2 diabetes with nephropathy (formerly [facility]) E11.21    DDD (degenerative disc disease), cervical M50.30    Osteopenia of neck of femur M85.859    Psychophysiological insomnia F51.04       Past Medical History:   Diagnosis Date    Diabetes ([facility] [address]     Double uterus     History of pulmonary embolus (PE) 1990's    x4- due to MTHFR deficiency    Menopause     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 (formerly [facility])          Current Outpatient Medications:     insulin lispro (HumaLOG KwikPen Insulin) 100 unit/mL kwikpen, INJECT 3 TIMES A DAY 10 TO 15 MINUTES BEFORE MEALS,ACCORDING TO SLIDING SCALE, Disp: 1 Adjustable Dose Pre-filled Pen Syringe, Rfl: 5    montelukast (SINGULAIR) 10 mg tablet, Take 1 Tablet by mouth daily. , Disp: 30 Tablet, Rfl: 0    fluconazole (DIFLUCAN) 150 mg tablet, TAKE 1 TABLET BY MOUTH DAILY FOR 1 DAY. , Disp: 1 Tablet, Rfl: 0    zolpidem (AMBIEN) 5 mg tablet, Take 1 Tablet by mouth nightly as needed for Sleep. Max Daily Amount: 5 mg., Disp: 90 Tablet, Rfl: 1    metFORMIN (GLUCOPHAGE) 1,000 mg tablet, Take 1 Tablet by mouth two (2) times daily (with meals). , Disp: 60 Tablet, Rfl: 0    cyclobenzaprine (FLEXERIL) 10 mg tablet, TAKE 1 TABLET BY MOUTH THREE (3) TIMES DAILY AS NEEDED FOR MUSCLE SPASM(S)., Disp: 90 Tablet, Rfl: 1    cetirizine (ZYRTEC) 10 mg tablet, TAKE 1 TABLET BY MOUTH EVERY DAY, Disp: 30 Tablet, Rfl: 0    lisinopriL (PRINIVIL, ZESTRIL) 2.5 mg tablet, TAKE 1 TABLET BY MOUTH EVERY DAY, Disp: 90 Tablet, Rfl: 1    insulin glargine (Basaglar KwikPen U-100 Insulin) 100 unit/mL (3 mL) inpn, INJECT 44 UNITS UNDER THE SKIN AT BEDTIME, Disp: 15 Pen, Rfl: 0    flash glucose sensor (FreeStyle Meredith 2 Sensor) kit, Check blood sugar four times daily, Disp: 1 Kit, Rfl: 5    levothyroxine (SYNTHROID) 112 mcg tablet, TAKE 1 TABLET BY MOUTH EVERY DAY BEFORE BREAKFAST, Disp: 90 Tablet, Rfl: 1    atorvastatin (LIPITOR) 40 mg tablet, TAKE 1 TABLET BY MOUTH EVERY DAY, Disp: 90 Tablet, Rfl: 3    glucose blood VI test strips (ASCENSIA AUTODISC VI, ONE TOUCH ULTRA TEST VI) strip, Check blood sugar BID DX E11.9, Disp: 100 Strip, Rfl: 11    lancets (OneTouch UltraSoft Lancets) misc, Check blood sugar bid DX E11.9, Disp: 100 Each, Rfl: 11    ibuprofen (MOTRIN) 400 mg tablet, Take 1 Tab by mouth two (2) times daily as needed for Pain., Disp: 60 Tab, Rfl: 0    Blood-Glucose Meter monitoring kit, Check blood suagr before meals and at bedtime.  E11.9  Needs One touch brand, Disp: 1 Kit, Rfl: 0    aspirin 81 mg chewable tablet, Take  by mouth., Disp: , Rfl:     Insulin Needles, Disposable, 32 gauge x 1/4\" ndle, 1 (one) Injection as directed, to michael with levemir pen for 0 days, Disp: , Rfl:     cholecalciferol (VITAMIN D3) (5000 Units/125 mcg) tab tablet, take one tablet by oral route once a day, Disp: , Rfl:     No Known Allergies    Past Surgical History:   Procedure Laterality Date    HX APPENDECTOMY      HX BACK SURGERY      heniated disc    HX  SECTION      x2    HX DILATION AND CURETTAGE      HX THYROIDECTOMY      HX TUBAL LIGATION         Social History     Tobacco Use   Smoking Status Former Smoker   Smokeless Tobacco Never Used       Social History     Socioeconomic History    Marital status:      Spouse name: Not on file    Number of children: Not on file    Years of education: Not on file    Highest education level: Not on file   Tobacco Use    Smoking status: Former Smoker    Smokeless tobacco: Never Used   Substance and Sexual Activity    Alcohol use: Yes    Drug use: Never     Social Determinants of Health     Financial Resource Strain:     Difficulty of Paying Living Expenses:    Food Insecurity:     Worried About Running Out of Food in the Last Year:     [address] in the Last Year:    Transportation Needs:     Lack of Transportation (Medical):  Lack of Transportation (Non-Medical):    Physical Activity:     Days of Exercise per Week:     Minutes of Exercise per Session:    Stress:     Feeling of Stress :    Social Connections:     Frequency of Communication with Friends and Family:     Frequency of Social Gatherings with Friends and Family:     Attends Mu-ism Services:     Active Member of Clubs or Organizations:     Attends Club or Organization Meetings:     Marital Status:        Family History   Problem Relation Age of Onset    Diabetes Mother     Thyroid Disease Mother     High Cholesterol Mother     Melanoma Father     Other Sister         l body disease       ROS:  Gen: denies fever, chills, or fatigue  HEENT:denies H/A, nasal congestion, or sore throat  Denies nasal congestion, runny nose, ear pain, or sore throat  Resp: denies dyspnea, cough, no wheezing  CV: denies chest pain, pressure, or palpitations  Extremeties: denies edema  GI[de-identified] denies nausea, vomiting, diarrhea, or constipation. Denies bloody or black stools  : Denies dysuria, hematuria, urinary frequency or urgency  Musculoskeletal:+ chronic pain in both shoulders  Neuro: +mild occasional numbness/tingling in feet, +occasional vertigo.   No tremors or seizures   Skin: denies rashes or new lesions  Psych: +insomnia- well controlled with Ambien, denies anxiety, depression, giovany, or other changes in mood      Objective:     Visit Vitals  /81 (BP 1 Location: Left arm)   Pulse 85   Temp 98.1 °F (36.7 °C)   Resp 20   Ht 5' 9\" (1.753 m)   Wt 208 lb (94.3 kg)   SpO2 98%   BMI 30.72 kg/m²       General: Alert and oriented. No acute distress. Well nourished. HEENT :  Eyes: Sclera white, conjunctiva clear. PERRLA. Extra ocular movements intact. Neck: Supple with FROM. No TTP  Lungs: Breathing even and unlabored. All lobes clear to auscultation bilaterally   Heart :RRR, S1 and S2 normal intensity, no extra heart sounds  Extremities: Non-edematous, no pallor or cyanosis. Bilat. radial and pedal pulses 2+   Musculo: Shoulders: No TTP, joint swelling, erythema, or edema. Neuro: Cranial nerves grossly normal. Sensation intact in both arms and hands  Psych: Mood and thought content appropriate for situation. Dressed appropriately and with good hygiene. Skin: warm dry and intact      Assessment & Plan:     Type 2 diabetes  Recheck A1C  Stop taking the Basaglar  Continue humalog with the sliding scale  Work on low carbohydrate diet  Cont to check BS daily with [employer] system- sensors refilled  Cont ACEi  Cont statin  Exercise  F/U 3 weeks    HLD  Cont Lipitor 40mg daily- this is the max dose she can tolerate  Work on low fat diet  Go to ER for CP or SOB  F/U 3 months    Acquired hypothyroidism  Recheck TSH  Cont Synthroid 112mcg daily.   F/U 3 months    Insomnia  Discussed risks of long term use of Ambien- she verbalized understanding   Encouraged pt to try Belsomra but she declines  Cont Ambien qHS -refill provided  F/U 3 months    Bilateral arm pain r/t cervical DDD  She was referred to spine specialist Dr Bharat Garsia at last appt but did not go  Went to ortho instead who recommended PT  She is not interested in PT  Cont Cyclobenzaprine 10mg TID prn   F/U 3 months    H/O PE  Cont baby aspirin daily  She was referred to [facility] Dr Kindra Canales at previous visit but did not go  Notify provider or go to ER for CP, SOB, dizziness, leg pain/swelling, or other concerns. 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 Referred to  Dr Romeo Guadalupe today  Eye exam- she was referred to Dr Katelynn Dey at the last appt  PNA vaccines- Prevnar 13 given 12/2020, due for Pneumovax 23 in 12/2021  Covid vaccine- she has had both Moderna vaccines  Shingrix- not addressed today        Verbal and written instructions (see AVS) provided.  Patient expresses understanding of diagnosis and treatment plan. Health Maintenance Due   Topic Date Due    Colorectal Cancer Screening Combo  Never done    MICROALBUMIN Q1  09/24/2021    A1C test (Diabetic or Prediabetic)  11/04/2021         Follow-up and Dispositions    · Return in about 3 weeks (around 11/23/2021).          German Rebolledo NP

## 2021-11-02 ENCOUNTER — OFFICE VISIT (OUTPATIENT)
Dept: FAMILY MEDICINE CLINIC | Age: 66
End: 2021-11-02
Payer: MEDICARE

## 2021-11-02 VITALS
HEIGHT: 69 IN | RESPIRATION RATE: 20 BRPM | OXYGEN SATURATION: 98 % | SYSTOLIC BLOOD PRESSURE: 137 MMHG | TEMPERATURE: 98.1 F | BODY MASS INDEX: 30.81 KG/M2 | HEART RATE: 85 BPM | DIASTOLIC BLOOD PRESSURE: 81 MMHG | WEIGHT: 208 LBS

## 2021-11-02 DIAGNOSIS — Z23 NEEDS FLU SHOT: ICD-10-CM

## 2021-11-02 DIAGNOSIS — B37.31 CANDIDA VAGINITIS: ICD-10-CM

## 2021-11-02 DIAGNOSIS — F51.01 PRIMARY INSOMNIA: ICD-10-CM

## 2021-11-02 DIAGNOSIS — G47.00 INSOMNIA, UNSPECIFIED TYPE: ICD-10-CM

## 2021-11-02 DIAGNOSIS — E03.9 ACQUIRED HYPOTHYROIDISM: ICD-10-CM

## 2021-11-02 DIAGNOSIS — Z86.711 HISTORY OF PULMONARY EMBOLUS (PE): ICD-10-CM

## 2021-11-02 DIAGNOSIS — Z00.00 ENCOUNTER FOR ANNUAL WELLNESS EXAM IN MEDICARE PATIENT: Primary | ICD-10-CM

## 2021-11-02 DIAGNOSIS — E72.12 MTHFR (METHYLENE THF REDUCTASE) DEFICIENCY AND HOMOCYSTINURIA (HCC): ICD-10-CM

## 2021-11-02 DIAGNOSIS — M79.602 BILATERAL ARM PAIN: ICD-10-CM

## 2021-11-02 DIAGNOSIS — R80.9 MICROALBUMINURIA: ICD-10-CM

## 2021-11-02 DIAGNOSIS — E11.21 TYPE 2 DIABETES WITH NEPHROPATHY (HCC): ICD-10-CM

## 2021-11-02 DIAGNOSIS — R09.82 POST-NASAL DRIP: ICD-10-CM

## 2021-11-02 DIAGNOSIS — M79.601 BILATERAL ARM PAIN: ICD-10-CM

## 2021-11-02 DIAGNOSIS — E72.11 MTHFR (METHYLENE THF REDUCTASE) DEFICIENCY AND HOMOCYSTINURIA (HCC): ICD-10-CM

## 2021-11-02 DIAGNOSIS — E78.5 HYPERLIPIDEMIA, UNSPECIFIED HYPERLIPIDEMIA TYPE: ICD-10-CM

## 2021-11-02 PROCEDURE — 90694 VACC AIIV4 NO PRSRV 0.5ML IM: CPT | Performed by: NURSE PRACTITIONER

## 2021-11-02 PROCEDURE — G0438 PPPS, INITIAL VISIT: HCPCS | Performed by: NURSE PRACTITIONER

## 2021-11-02 PROCEDURE — G0008 ADMIN INFLUENZA VIRUS VAC: HCPCS | Performed by: NURSE PRACTITIONER

## 2021-11-02 RX ORDER — METFORMIN HYDROCHLORIDE 1000 MG/1
1000 TABLET ORAL 2 TIMES DAILY WITH MEALS
Qty: 60 TABLET | Refills: 0 | Status: SHIPPED | OUTPATIENT
Start: 2021-11-02 | End: 2021-11-23 | Stop reason: SDUPTHER

## 2021-11-02 RX ORDER — MONTELUKAST SODIUM 10 MG/1
10 TABLET ORAL DAILY
Qty: 30 TABLET | Refills: 0 | Status: SHIPPED | OUTPATIENT
Start: 2021-11-02 | End: 2021-12-02

## 2021-11-02 RX ORDER — INSULIN LISPRO 100 [IU]/ML
INJECTION, SOLUTION INTRAVENOUS; SUBCUTANEOUS
Qty: 1 ADJUSTABLE DOSE PRE-FILLED PEN SYRINGE | Refills: 5 | Status: SHIPPED | OUTPATIENT
Start: 2021-11-02 | End: 2021-11-03 | Stop reason: SDUPTHER

## 2021-11-02 RX ORDER — FLUCONAZOLE 150 MG/1
TABLET ORAL
Qty: 1 TABLET | Refills: 0 | Status: SHIPPED | OUTPATIENT
Start: 2021-11-02 | End: 2021-11-23 | Stop reason: SDUPTHER

## 2021-11-02 RX ORDER — ZOLPIDEM TARTRATE 5 MG/1
5 TABLET ORAL
Qty: 90 TABLET | Refills: 1 | Status: SHIPPED | OUTPATIENT
Start: 2021-11-02 | End: 2022-01-25 | Stop reason: SDUPTHER

## 2021-11-02 NOTE — PROGRESS NOTES
This is an Initial Medicare Annual Wellness Exam (AWV) (Performed 12 months after IPPE or effective date of Medicare Part B enrollment, Once in a lifetime)    I have reviewed the patient's medical history in detail and updated the computerized patient record. Assessment/Plan   Education and counseling provided:  Are appropriate based on today's review and evaluation    1. Encounter for annual wellness exam in Medicare patient  2. Type 2 diabetes with nephropathy (HCC)  -     CBC WITH AUTOMATED DIFF; Future  -     METABOLIC PANEL, COMPREHENSIVE; Future  -     HEMOGLOBIN A1C WITH EAG; Future  3. Needs flu shot  -     FLU (FLUAD QUAD INFLUENZA VACCINE,QUAD,ADJUVANTED)  4. Acquired hypothyroidism  -     TSH 3RD GENERATION; Future  5. Candida vaginitis  -     fluconazole (DIFLUCAN) 150 mg tablet; TAKE 1 TABLET BY MOUTH DAILY FOR 1 DAY. , Normal, Disp-1 Tablet, R-0  6. Primary insomnia  -     zolpidem (AMBIEN) 5 mg tablet; Take 1 Tablet by mouth nightly as needed for Sleep. Max Daily Amount: 5 mg., Normal, Disp-90 Tablet, R-1Not to exceed 5 additional fills before 01/10/2022 DX Code Needed  . 7. 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 Microalbuminuria  9. Hyperlipidemia, unspecified hyperlipidemia type  10. Insomnia, unspecified type  11. History of pulmonary embolus (PE)  12. Bilateral arm pain  13. Post-nasal drip       Depression Risk Factor Screening     3 most recent PHQ Screens 11/2/2021   Little interest or pleasure in doing things Not at all   Feeling down, depressed, irritable, or hopeless Not at all   Total Score PHQ 2 0       Alcohol Risk Screen    Do you average more than 1 drink per night or more than 7 drinks a week:  No    On any one occasion in the past three months have you have had more than 3 drinks containing alcohol:  No       Functional Ability:   Does the patient exhibit a steady gait?   {gen no default/yes/free text:[id number]::\"yes   How long did it take the patient to get up and walk from a sitting position? 2sec   Is the patient self reliant?  (ie can do own laundry, meals, household chores)  yes     Does the patient handle his/her own medications? yes     Does the patient handle his/her own money? yes     Is the patients home safe (ie good lighting, handrails on stairs and bath, etc.)? yes     Did you notice or did patient express any hearing difficulties? no     Did you notice or did patient express any vision difficulties?   no     Were distance and reading eye charts used? no       Advance Care Planning:   Patient was offered the opportunity to discuss advance care planning:  yes     Does patient have an Advance Directive:  no   If no, did you provide information on Caring Connections? yes     ADL Assessment 11/2/2021   Feeding yourself No Help Needed   Getting from bed to chair No Help Needed   Getting dressed No Help Needed   Bathing or showering No Help Needed   Walk across the room (includes cane/walker) No Help Needed   Using the telphone No Help Needed   Taking your medications No Help Needed   Preparing meals No Help Needed   Managing money (expenses/bills) No Help Needed   Moderately strenuous housework (laundry) No Help Needed   Shopping for personal items (toiletries/medicines) No Help Needed   Shopping for groceries No Help Needed   Driving No Help Needed   Climbing a flight of stairs No Help Needed   Getting to places beyond walking distances No Help Needed           Functional Ability and Level of Safety    Hearing: Hearing is good. Activities of Daily Living: The home contains: no safety equipment. Patient does total self care     Ambulation: with no difficulty      Fall Risk:  Fall Risk Assessment, last 12 mths 11/2/2021   Able to walk? Yes   Fall in past 12 months? 0   Do you feel unsteady?  0   Are you worried about falling 0      Abuse Screen:  Patient is not abused       Cognitive Screening    Has your family/caregiver stated any concerns about your memory: no     Cognitive Screening: Normal - Clock Drawing Test    Health Maintenance Due     Health Maintenance Due   Topic Date Due    Colorectal Cancer Screening Combo  Never done    MICROALBUMIN Q1  2021    A1C test (Diabetic or Prediabetic)  2021       Patient Care Team   Patient Care Team:  Wesley Arroyo, NP as PCP - General (Nurse Practitioner)  Wesley Arroyo, NP as PCP - [facility] Empaneled Provider    History     Patient Active Problem List   Diagnosis Code    Type 2 diabetes mellitus with hyperglycemia, with long-term current use of insulin (HCC) E11.65, Z79.4    Postoperative hypothyroidism E89.0    Primary insomnia F51.01    Incomplete tear of right rotator cuff M75.111    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 ([address] E72.12, E72.11    Skin lesion of face L98.9    History of pulmonary embolism Z86.711    Chronic sinusitis J32.9    Diaphoresis R61    Microalbuminuria R80.9    Mixed hyperlipidemia E78.2    Type 2 diabetes with nephropathy ([address] E11.21    DDD (degenerative disc disease), cervical M50.30    Osteopenia of neck of femur M85.859    Psychophysiological insomnia F51.04     Past Medical History:   Diagnosis Date    Diabetes ([address]     Double uterus     History of pulmonary embolus (PE)     x4- due to MTHFR deficiency    Menopause     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 30 Tablet 0    fluconazole (DIFLUCAN) 150 mg tablet TAKE 1 TABLET BY MOUTH DAILY FOR 1 DAY. 1 Tablet 0    zolpidem (AMBIEN) 5 mg tablet Take 1 Tablet by mouth nightly as needed for Sleep. Max Daily Amount: 5 mg. 90 Tablet 1    metFORMIN (GLUCOPHAGE) 1,000 mg tablet Take 1 Tablet by mouth two (2) times daily (with meals). 60 Tablet 0    cyclobenzaprine (FLEXERIL) 10 mg tablet TAKE 1 TABLET BY MOUTH THREE (3) TIMES DAILY AS NEEDED FOR MUSCLE SPASM(S). 90 Tablet 1    cetirizine (ZYRTEC) 10 mg tablet TAKE 1 TABLET BY MOUTH EVERY DAY 30 Tablet 0    lisinopriL (PRINIVIL, ZESTRIL) 2.5 mg tablet TAKE 1 TABLET BY MOUTH EVERY DAY 90 Tablet 1    insulin glargine (Basaglar KwikPen U-100 Insulin) 100 unit/mL (3 mL) inpn INJECT 44 UNITS UNDER THE SKIN AT BEDTIME 15 Pen 0    flash glucose sensor (FreeStyle Meredith 2 Sensor) kit Check blood sugar four times daily 1 Kit 5    levothyroxine (SYNTHROID) 112 mcg tablet TAKE 1 TABLET BY MOUTH EVERY DAY BEFORE BREAKFAST 90 Tablet 1    atorvastatin (LIPITOR) 40 mg tablet TAKE 1 TABLET BY MOUTH EVERY DAY 90 Tablet 3    glucose blood VI test strips (ASCENSIA AUTODISC VI, ONE TOUCH ULTRA TEST VI) strip Check blood sugar BID DX E11.9 100 Strip 11    lancets (OneTouch UltraSoft Lancets) misc Check blood sugar bid DX E11.9 100 Each 11    ibuprofen (MOTRIN) 400 mg tablet Take 1 Tab by mouth two (2) times daily as needed for Pain. 60 Tab 0    Blood-Glucose Meter monitoring kit Check blood suagr before meals and at bedtime. E11.9  Needs One touch brand 1 Kit 0    aspirin 81 mg chewable tablet Take  by mouth.       Insulin Needles, Disposable, 32 gauge x 1/4\" ndle 1 (one) Injection as directed, to michael with levemir pen for 0 days      cholecalciferol (VITAMIN D3) (5000 Units/125 mcg) tab tablet take one tablet by oral route once a day       No Known Allergies    Family History   Problem Relation Age of Onset    Diabetes Mother     Thyroid Disease Mother  High Cholesterol Mother     Melanoma Father     Other Sister         l body disease     Social History     Tobacco Use    Smoking status: Former Smoker    Smokeless tobacco: Never Used   Substance Use Topics    Alcohol use: Yes     PRASHANT Posey LPN

## 2021-11-02 NOTE — PATIENT INSTRUCTIONS
Medicare Wellness Visit, Female The best way to live healthy is to have a lifestyle where you eat a well-balanced diet, exercise regularly, limit alcohol use, and quit all forms of tobacco/nicotine, if applicable. Regular preventive services are another way to keep healthy. Preventive services (vaccines, screening tests, monitoring & exams) can help personalize your care plan, which helps you manage your own care. Screening tests can find health problems at the earliest stages, when they are easiest to treat. [facility] follows the current, evidence-based guidelines published by the [facility] ([facility]) when recommending preventive services for our patients. Because we follow these guidelines, sometimes recommendations change over time as research supports it. (For example, mammograms used to be recommended annually. Even though Medicare will still pay for an annual mammogram, the newer guidelines recommend a mammogram every two years for women of average risk). Of course, you and your doctor may decide to screen more often for some diseases, based on your risk and your co-morbidities (chronic disease you are already diagnosed with). Preventive services for you include: - Medicare offers their members a free annual wellness visit, which is time for you and your primary care provider to discuss and plan for your preventive service needs. Take advantage of this benefit every year! 
-All adults over the age of 72 should receive the recommended pneumonia vaccines. Current USPSTF guidelines recommend a series of two vaccines for the best pneumonia protection.  
-All adults should have a flu vaccine yearly and a tetanus vaccine every 10 years.  
-All adults age 48 and older should receive the shingles vaccines (series of two vaccines).      
-All adults age 38-68 who are overweight should have a diabetes screening test once every three years.  
-All adults born between [birth year] and 1965 should be screened once for Hepatitis C. 
-Other screening tests and preventive services for persons with diabetes include: an eye exam to screen for diabetic retinopathy, a kidney function test, a foot exam, and stricter control over your cholesterol.  
-Cardiovascular screening for adults with routine risk involves an electrocardiogram (ECG) at intervals determined by your doctor.  
-Colorectal cancer screenings should be done for adults age 54-65 with no increased risk factors for colorectal cancer. There are a number of acceptable methods of screening for this type of cancer. Each test has its own benefits and drawbacks. Discuss with your doctor what is most appropriate for you during your annual wellness visit. The different tests include: colonoscopy (considered the best screening method), a fecal occult blood test, a fecal DNA test, and sigmoidoscopy. 
 
-A bone mass density test is recommended when a woman turns 65 to screen for osteoporosis. This test is only recommended one time, as a screening. Some providers will use this same test as a disease monitoring tool if you already have osteoporosis. -Breast cancer screenings are recommended every other year for women of normal risk, age 54-69. 
-Cervical cancer screenings for women over age 72 are only recommended with certain risk factors. Here is a list of your current Health Maintenance items (your personalized list of preventive services) with a due date: 
Health Maintenance Due Topic Date Due  
 Colorectal Screening  Never done  Albumin Urine Test  09/24/2021  Hemoglobin A1C    11/04/2021 Vaccine Information Statement Influenza (Flu) Vaccine (Inactivated or Recombinant): What You Need to Know Many vaccine information statements are available in [place] and other languages. See www.immunize.org/vis. Hojas de información sobre vacunas están disponibles en español y en muchos otros idiomas. Visite www.immunize.org/vis. 1. Why get vaccinated? Influenza vaccine can prevent influenza (flu). Flu is a contagious disease that spreads around the [place] every year, usually between October and May. Anyone can get the flu, but it is more dangerous for some people. Infants and young children, people 72 years and older, pregnant people, and people with certain health conditions or a weakened immune system are at greatest risk of flu complications. Pneumonia, bronchitis, sinus infections, and ear infections are examples of flu-related complications. If you have a medical condition, such as heart disease, cancer, or diabetes, flu can make it worse. Flu can cause fever and chills, sore throat, muscle aches, fatigue, cough, headache, and runny or stuffy nose. Some people may have vomiting and diarrhea, though this is more common in children than adults. In an average year, thousands of people in the [facility] die from flu, and many more are hospitalized. Flu vaccine prevents millions of illnesses and flu-related visits to the doctor each year. 2. Influenza vaccines CDC recommends everyone 6 months and older get vaccinated every flu season. Children 6 months through 6years of age may need 2 doses during a single flu season. Everyone else needs only 1 dose each flu season. It takes about 2 weeks for protection to develop after vaccination. There are many flu viruses, and they are always changing. Each year a new flu vaccine is made to protect against the influenza viruses believed to be likely to cause disease in the upcoming flu season. Even when the vaccine doesnt exactly match these viruses, it may still provide some protection. Influenza vaccine does not cause flu. Influenza vaccine may be given at the same time as other vaccines. 3. Talk with your health care provider Tell your vaccination provider if the person getting the vaccine: 
 Has had an allergic reaction after a previous dose of influenza vaccine, or has any severe, life-threatening allergies  Has ever had Guillain-Barré Syndrome (also called GBS) In some cases, your health care provider may decide to postpone influenza vaccination until a future visit. Influenza vaccine can be administered at any time during pregnancy. People who are or will be pregnant during influenza season should receive inactivated influenza vaccine. People with minor illnesses, such as a cold, may be vaccinated. People who are moderately or severely ill should usually wait until they recover before getting influenza vaccine. Your health care provider can give you more information. 4. Risks of a vaccine reaction  Soreness, redness, and swelling where the shot is given, fever, muscle aches, and headache can happen after influenza vaccination.  There may be a very small increased risk of Guillain-Barré Syndrome (GBS) after inactivated influenza vaccine (the flu shot). Vernona Kris children who get the flu shot along with pneumococcal vaccine (PCV13) and/or DTaP vaccine at the same time might be slightly more likely to have a seizure caused by fever. Tell your health care provider if a child who is getting flu vaccine has ever had a seizure. People sometimes faint after medical procedures, including vaccination. Tell your provider if you feel dizzy or have vision changes or ringing in the ears. As with any medicine, there is a very remote chance of a vaccine causing a severe allergic reaction, other serious injury, or death. 5. What if there is a serious problem? An allergic reaction could occur after the vaccinated person leaves the clinic.  If you see signs of a severe allergic reaction (hives, swelling of the face and throat, difficulty breathing, a fast heartbeat, dizziness, or weakness), call 9-1-1 and get the person to the nearest hospital. 
 
For other signs that concern you, call your health care provider. Adverse reactions should be reported to the Vaccine Adverse Event Reporting System (VAERS). Your health care provider will usually file this report, or you can do it yourself. Visit the VAERS website at www.vaers. [facility].gov or call 9-313.973.7003. VAERS is only for reporting reactions, and VAERS staff members do not give medical advice. 6. The National Vaccine Injury Compensation Program 
 
The [facility] Vaccine Injury Compensation Program (VICP) is a federal program that was created to compensate people who may have been injured by certain vaccines. Claims regarding alleged injury or death due to vaccination have a time limit for filing, which may be as short as two years. Visit the VICP website at www.[facility].gov/vaccinecompensation or call 5-321.200.8137 to learn about the program and about filing a claim. 7. How can I learn more?  Ask your health care provider.  Call your local or state health department.  Visit the website of the Food and Drug Administration (FDA) for vaccine package inserts and additional information at www.fda.gov/vaccines-blood-biologics/vaccines.  Contact the Centers for Disease Control and Prevention (CDC): 
- Call 3-511.575.5092 (1-800-CDC-INFO) or 
- Visit CDCs influenza website at www.cdc.gov/flu. Vaccine Information Statement Inactivated Influenza Vaccine 8/6/2021 
42 VINCENT Owens [id number]-[id number] Department of [facility] and [employer] Centers for Disease Control and Prevention Office Use Only

## 2021-11-03 LAB
ALBUMIN SERPL-MCNC: 4.2 G/DL (ref 3.5–5)
ALBUMIN/GLOB SERPL: 1.4 {RATIO} (ref 1.1–2.2)
ALP SERPL-CCNC: 79 U/L (ref 45–117)
ALT SERPL-CCNC: 36 U/L (ref 12–78)
ANION GAP SERPL CALC-SCNC: 4 MMOL/L (ref 5–15)
AST SERPL-CCNC: 18 U/L (ref 15–37)
BASOPHILS # BLD: 0.1 K/UL (ref 0–0.1)
BASOPHILS NFR BLD: 1 % (ref 0–1)
BILIRUB SERPL-MCNC: 0.6 MG/DL (ref 0.2–1)
BUN SERPL-MCNC: 12 MG/DL (ref 6–20)
BUN/CREAT SERPL: 14 (ref 12–20)
CALCIUM SERPL-MCNC: 9.6 MG/DL (ref 8.5–10.1)
CHLORIDE SERPL-SCNC: 102 MMOL/L (ref 97–108)
CO2 SERPL-SCNC: 31 MMOL/L (ref 21–32)
CREAT SERPL-MCNC: 0.87 MG/DL (ref 0.55–1.02)
DIFFERENTIAL METHOD BLD: NORMAL
EOSINOPHIL # BLD: 0.4 K/UL (ref 0–0.4)
EOSINOPHIL NFR BLD: 6 % (ref 0–7)
ERYTHROCYTE [DISTWIDTH] IN BLOOD BY AUTOMATED COUNT: 11.8 % (ref 11.5–14.5)
EST. AVERAGE GLUCOSE BLD GHB EST-MCNC: 206 MG/DL
GLOBULIN SER CALC-MCNC: 3.1 G/DL (ref 2–4)
GLUCOSE SERPL-MCNC: 218 MG/DL (ref 65–100)
HBA1C MFR BLD: 8.8 % (ref 4–5.6)
HCT VFR BLD AUTO: 44.5 % (ref 35–47)
HGB BLD-MCNC: 14.7 G/DL (ref 11.5–16)
IMM GRANULOCYTES # BLD AUTO: 0 K/UL (ref 0–0.04)
IMM GRANULOCYTES NFR BLD AUTO: 0 % (ref 0–0.5)
LYMPHOCYTES # BLD: 2.2 K/UL (ref 0.8–3.5)
LYMPHOCYTES NFR BLD: 32 % (ref 12–49)
MCH RBC QN AUTO: 29.7 PG (ref 26–34)
MCHC RBC AUTO-ENTMCNC: 33 G/DL (ref 30–36.5)
MCV RBC AUTO: 89.9 FL (ref 80–99)
MONOCYTES # BLD: 0.5 K/UL (ref 0–1)
MONOCYTES NFR BLD: 8 % (ref 5–13)
NEUTS SEG # BLD: 3.7 K/UL (ref 1.8–8)
NEUTS SEG NFR BLD: 53 % (ref 32–75)
NRBC # BLD: 0 K/UL (ref 0–0.01)
NRBC BLD-RTO: 0 PER 100 WBC
PLATELET # BLD AUTO: 255 K/UL (ref 150–400)
PMV BLD AUTO: 10.5 FL (ref 8.9–12.9)
POTASSIUM SERPL-SCNC: 4.2 MMOL/L (ref 3.5–5.1)
PROT SERPL-MCNC: 7.3 G/DL (ref 6.4–8.2)
RBC # BLD AUTO: 4.95 M/UL (ref 3.8–5.2)
SODIUM SERPL-SCNC: 137 MMOL/L (ref 136–145)
TSH SERPL DL<=0.05 MIU/L-ACNC: 2.22 UIU/ML (ref 0.36–3.74)
WBC # BLD AUTO: 6.9 K/UL (ref 3.6–11)

## 2021-11-03 RX ORDER — INSULIN LISPRO 100 [IU]/ML
INJECTION, SOLUTION INTRAVENOUS; SUBCUTANEOUS
Qty: 1 ADJUSTABLE DOSE PRE-FILLED PEN SYRINGE | Refills: 5 | Status: SHIPPED | OUTPATIENT
Start: 2021-11-03 | End: 2021-11-03

## 2021-11-03 RX ORDER — INSULIN ASPART 100 [IU]/ML
INJECTION, SOLUTION INTRAVENOUS; SUBCUTANEOUS
Qty: 1 ADJUSTABLE DOSE PRE-FILLED PEN SYRINGE | Refills: 5 | Status: SHIPPED | OUTPATIENT
Start: 2021-11-03

## 2021-11-17 NOTE — PROGRESS NOTES
Subjective:     Chief Complaint   Patient presents with    Joint Pain     nuckles and elbows painful and swollen.  Diabetes       HPI  She is a retired  from [place]. Type 2 diabetes  Lab Results   Component Value Date/Time    Hemoglobin A1c 8.8 (H) 11/02/2021 11:00 AM     At her last OV her blood sugars were dropping in the 50's overnight. She had stopped taking her Basaglar insulin 46U qhs and continued to use the sliding scale insulin (insulin lispro prescribed to take TID qAC according to SS.) She was also taking metformin 1000mg BID-requesting a refill. She was doing an intermittent fasting diet-she only eats for eight hours out of the day and she believes this is why her blood sugar was dropping in the middle of the night. She was told to record her blood sugars and follow up in the office in 3 weeks since she wanted to stop the [address]. Today, she states her blood sugars will still drop in the middle of the night. She was on a trip and did not follow her Keto diet over the last few weeks. Last night her Freestyle gerald woke her up with an alarm stating her BS was 70. She ate a marshmallow and a few hours later her blood sugar was in the 200's. She has not had anything else to eat today. POC glucose in the office was 185. She is requesting a c-peptide test to see how much insulin her body is secreting. Of note, she was referred to Endo months ago but never made an appt. Januvia and Araceli gave her horrible yeast infections. 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 Yeast infection  States she has a history of yeast infections and she feels like she has one right now. Requesting diflucan. Pain in elbows and hand swelling  She reports a FH of rheumatoid arthritis. States recently her elbows have felt swollen and painful. Also states her hands feels stiff and swollen. She would like to do testing for rheumatoid arthritis today. 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 , Disp: 2 Tablet, Rfl: 0    metFORMIN (GLUCOPHAGE) 1,000 mg tablet, Take 1 Tablet by mouth two (2) times daily (with meals). , Disp: 60 Tablet, Rfl: 2    insulin aspart U-100 (NOVOLOG) 100 unit/mL (3 mL) inpn, INJECT 3 TIMES A DAY 10 TO 15 MINUTES BEFORE MEALS,ACCORDING TO SLIDING SCALE 150-200 2 units 201-249 4 units 250-300 6 units Max daily dose is 18U, Disp: 1 Adjustable Dose Pre-filled Pen Syringe, Rfl: 5    montelukast (SINGULAIR) 10 mg tablet, Take 1 Tablet by mouth daily. , Disp: 30 Tablet, Rfl: 0    zolpidem (AMBIEN) 5 mg tablet, Take 1 Tablet by mouth nightly as needed for Sleep.  Max Daily Amount: 5 mg., Disp: 90 Tablet, Rfl: 1    cyclobenzaprine (FLEXERIL) 10 mg tablet, TAKE 1 TABLET BY MOUTH THREE (3) TIMES DAILY AS NEEDED FOR MUSCLE SPASM(S)., Disp: 90 Tablet, Rfl: 1    cetirizine (ZYRTEC) 10 mg tablet, TAKE 1 TABLET BY MOUTH EVERY DAY, Disp: 30 Tablet, Rfl: 0    lisinopriL (PRINIVIL, ZESTRIL) 2.5 mg tablet, TAKE 1 TABLET BY MOUTH EVERY DAY, Disp: 90 Tablet, Rfl: 1    insulin glargine (Basaglar KwikPen U-100 Insulin) 100 unit/mL (3 mL) inpn, INJECT 44 UNITS UNDER THE SKIN AT BEDTIME, Disp: 15 Pen, Rfl: 0    flash glucose sensor (FreeStyle Meredith 2 Sensor) kit, Check blood sugar four times daily, Disp: 1 Kit, Rfl: 5    levothyroxine (SYNTHROID) 112 mcg tablet, TAKE 1 TABLET BY MOUTH EVERY DAY BEFORE BREAKFAST, Disp: 90 Tablet, Rfl: 1    atorvastatin (LIPITOR) 40 mg tablet, TAKE 1 TABLET BY MOUTH EVERY DAY, Disp: 90 Tablet, Rfl: 3    glucose blood VI test strips (ASCENSIA AUTODISC VI, ONE TOUCH ULTRA TEST VI) strip, Check blood sugar BID DX E11.9, Disp: 100 Strip, Rfl: 11    lancets (OneTouch UltraSoft Lancets) misc, Check blood sugar bid DX E11.9, Disp: 100 Each, Rfl: 11    ibuprofen (MOTRIN) 400 mg tablet, Take 1 Tab by mouth two (2) times daily as needed for Pain., Disp: 60 Tab, Rfl: 0    Blood-Glucose Meter monitoring kit, Check blood suagr before meals and at bedtime.  E11.9  Needs One touch brand, Disp: 1 Kit, Rfl: 0    aspirin 81 mg chewable tablet, Take  by mouth., Disp: , Rfl:     Insulin Needles, Disposable, 32 gauge x 1/4\" ndle, 1 (one) Injection as directed, to michael with levemir pen for 0 days, Disp: , Rfl:     cholecalciferol (VITAMIN D3) (5000 Units/125 mcg) tab tablet, take one tablet by oral route once a day, Disp: , Rfl:     No Known Allergies    Past Surgical History:   Procedure Laterality Date    HX APPENDECTOMY      HX BACK SURGERY      heniated disc    HX  SECTION      x2    HX DILATION AND CURETTAGE      HX THYROIDECTOMY      HX TUBAL LIGATION         Social History     Tobacco Use   Smoking Status Former Smoker   Smokeless Tobacco Never Used       Social History     Socioeconomic History    Marital status:    Tobacco Use    Smoking status: Former Smoker    Smokeless tobacco: Never Used   Substance and Sexual Activity    Alcohol use: Yes    Drug use: Never       Family History   Problem Relation Age of Onset    Diabetes Mother     Thyroid Disease Mother     High Cholesterol Mother     Melanoma Father     Other Sister         l body disease       ROS:  Gen: denies fever, chills, or fatigue  HEENT:denies H/A, nasal congestion, or sore throat  Denies nasal congestion, runny nose, ear pain, or sore throat  Resp: denies dyspnea, cough, no wheezing  CV: denies chest pain, pressure, or palpitations  Extremeties: denies edema  GI[de-identified] denies nausea, vomiting, diarrhea, or constipation. Denies bloody or black stools  : Denies dysuria, hematuria, urinary frequency or urgency  Musculoskeletal:+ pain and swelling to bilateral hands and elbows  Neuro: +mild occasional numbness/tingling in feet, +occasional vertigo. No tremors or seizures   Skin: +vaginal itching  Psych: +insomnia- well controlled with Ambien, denies anxiety, depression, giovany, or other changes in mood      Objective:     Visit Vitals  /72 (BP 1 Location: Left arm, BP Patient Position: Sitting)   Pulse 73   Temp 97.6 °F (36.4 °C) (Temporal)   Resp 18   Ht 5' 9\" (1.753 m)   Wt 208 lb 6 oz (94.5 kg)   SpO2 97%   BMI 30.77 kg/m²       General: Alert and oriented. No acute distress. Well nourished. HEENT :  Eyes: Sclera white, conjunctiva clear. PERRLA. Extra ocular movements intact. Neck: Supple with FROM. No TTP  Lungs: Breathing even and unlabored. All lobes clear to auscultation bilaterally   Heart :RRR, S1 and S2 normal intensity, no extra heart sounds  Extremities: Non-edematous, no pallor or cyanosis. Bilat. radial and pedal pulses 2+   Musculo: Elbows: +mild swelling to right elbow. + swelling to bilateral knuckles on hands   Neuro: Cranial nerves grossly normal. Sensation intact in both arms and hands  Psych: Mood and thought content appropriate for situation. Dressed appropriately and with good hygiene.   Skin: warm dry and intact      Assessment & Plan:     Type 2 diabetes  Check labs  Start Basaglar 20U at night and 10 U in the am-decrease in dose  Continue humalog with the sliding scale  Work on low carbohydrate diet  Cont to [facility] BS daily with [facility]  Cont ACEi  Cont statin  Exercise  F/U: 1 month    Swelling in hands and elbow pain  Check labs-will send to rheumatology if labs suggestive of rheumatoid arthritis  Will call with results  F/U: 3 months or sooner pending results      Verbal and written instructions (see AVS) provided.  Patient expresses understanding of diagnosis and treatment plan.     Health Maintenance Due   Topic Date Due    Eye Exam Retinal or Dilated  Never done    Colorectal Cancer Screening Combo  Never done    MICROALBUMIN Q1  09/24/2021             Elle Meneses NP

## 2021-11-23 ENCOUNTER — OFFICE VISIT (OUTPATIENT)
Dept: FAMILY MEDICINE CLINIC | Age: 66
End: 2021-11-23
Payer: MEDICARE

## 2021-11-23 VITALS
TEMPERATURE: 97.6 F | SYSTOLIC BLOOD PRESSURE: 133 MMHG | BODY MASS INDEX: 30.86 KG/M2 | DIASTOLIC BLOOD PRESSURE: 72 MMHG | WEIGHT: 208.38 LBS | OXYGEN SATURATION: 97 % | HEIGHT: 69 IN | RESPIRATION RATE: 18 BRPM | HEART RATE: 73 BPM

## 2021-11-23 DIAGNOSIS — M25.521 PAIN OF BOTH ELBOWS: ICD-10-CM

## 2021-11-23 DIAGNOSIS — B37.31 CANDIDA VAGINITIS: ICD-10-CM

## 2021-11-23 DIAGNOSIS — M25.522 PAIN OF BOTH ELBOWS: ICD-10-CM

## 2021-11-23 DIAGNOSIS — M79.89 BILATERAL HAND SWELLING: ICD-10-CM

## 2021-11-23 DIAGNOSIS — E11.21 TYPE 2 DIABETES WITH NEPHROPATHY (HCC): Primary | ICD-10-CM

## 2021-11-23 LAB — GLUCOSE POC: 185 MG/DL

## 2021-11-23 PROCEDURE — 36415 COLL VENOUS BLD VENIPUNCTURE: CPT | Performed by: NURSE PRACTITIONER

## 2021-11-23 PROCEDURE — 99214 OFFICE O/P EST MOD 30 MIN: CPT | Performed by: NURSE PRACTITIONER

## 2021-11-23 PROCEDURE — 82962 GLUCOSE BLOOD TEST: CPT | Performed by: NURSE PRACTITIONER

## 2021-11-23 RX ORDER — FLUCONAZOLE 150 MG/1
TABLET ORAL
Qty: 2 TABLET | Refills: 0 | Status: SHIPPED | OUTPATIENT
Start: 2021-11-23 | End: 2022-01-11

## 2021-11-23 RX ORDER — METFORMIN HYDROCHLORIDE 1000 MG/1
1000 TABLET ORAL 2 TIMES DAILY WITH MEALS
Qty: 60 TABLET | Refills: 2 | Status: SHIPPED | OUTPATIENT
Start: 2021-11-23 | End: 2022-03-02

## 2021-11-23 RX ORDER — INSULIN GLARGINE 100 [IU]/ML
INJECTION, SOLUTION SUBCUTANEOUS
Qty: 15 PEN | Refills: 0
Start: 2021-11-23 | End: 2022-01-11

## 2021-11-23 NOTE — PROGRESS NOTES
1. Have you been to the ER, urgent care clinic since your last visit? Hospitalized since your last visit? No    2. Have you seen or consulted any other health care providers outside of the [address] [place] [address] since your last visit? Include any pap smears or colon screening. No   Chief Complaint   Patient presents with    Joint Pain     nuckles and elbows painful and swollen.     Diabetes     Visit Vitals  /72 (BP 1 Location: Left arm, BP Patient Position: Sitting)   Pulse 73   Temp 97.6 °F (36.4 °C) (Temporal)   Resp 18   Ht 5' 9\" (1.753 m)   Wt 208 lb 6 oz (94.5 kg)   SpO2 97%   BMI 30.77 kg/m²

## 2021-11-24 LAB — ERYTHROCYTE [SEDIMENTATION RATE] IN BLOOD: 11 MM/HR (ref 0–30)

## 2021-11-25 LAB
ANA SER QL: NEGATIVE
C PEPTIDE SERPL-MCNC: 1.9 NG/ML (ref 1.1–4.4)

## 2021-12-02 LAB
14.3.3 ETA, RHEUM. ARTHRITIS: <0.2 NG/ML
CCP IGA+IGG SERPL IA-ACNC: <20 UNITS
RHEUMATOID FACT SERPL-ACNC: <14 UNITS/ML

## 2021-12-02 RX ORDER — MONTELUKAST SODIUM 10 MG/1
TABLET ORAL
Qty: 30 TABLET | Refills: 0 | Status: SHIPPED | OUTPATIENT
Start: 2021-12-02 | End: 2021-12-17 | Stop reason: SDUPTHER

## 2021-12-03 NOTE — PROGRESS NOTES
Rheumatoid panel and inflammatory markers were all negative. Meaning rheumatoid arthritis is not likely.

## 2021-12-17 NOTE — TELEPHONE ENCOUNTER
Patient requesting refill on     Requested Prescriptions     Pending Prescriptions Disp Refills    montelukast (SINGULAIR) 10 mg tablet 90 Tablet 1     Sig: Take 1 Tablet by mouth daily.          Last OV 11/23/21

## 2021-12-18 RX ORDER — MONTELUKAST SODIUM 10 MG/1
10 TABLET ORAL DAILY
Qty: 90 TABLET | Refills: 1 | Status: SHIPPED | OUTPATIENT
Start: 2021-12-18

## 2022-01-10 DIAGNOSIS — B37.31 CANDIDA VAGINITIS: ICD-10-CM

## 2022-01-11 RX ORDER — FLUCONAZOLE 150 MG/1
TABLET ORAL
Qty: 2 TABLET | Refills: 0 | Status: SHIPPED | OUTPATIENT
Start: 2022-01-11 | End: 2022-01-25 | Stop reason: SDUPTHER

## 2022-01-25 ENCOUNTER — OFFICE VISIT (OUTPATIENT)
Dept: FAMILY MEDICINE CLINIC | Age: 67
End: 2022-01-25
Payer: MEDICARE

## 2022-01-25 VITALS
HEIGHT: 69 IN | SYSTOLIC BLOOD PRESSURE: 116 MMHG | WEIGHT: 203 LBS | HEART RATE: 82 BPM | BODY MASS INDEX: 30.07 KG/M2 | OXYGEN SATURATION: 98 % | DIASTOLIC BLOOD PRESSURE: 76 MMHG | RESPIRATION RATE: 18 BRPM | TEMPERATURE: 97.1 F

## 2022-01-25 DIAGNOSIS — Z79.4 TYPE 2 DIABETES MELLITUS WITH HYPERGLYCEMIA, WITH LONG-TERM CURRENT USE OF INSULIN (HCC): ICD-10-CM

## 2022-01-25 DIAGNOSIS — E11.21 TYPE 2 DIABETES WITH NEPHROPATHY (HCC): ICD-10-CM

## 2022-01-25 DIAGNOSIS — F51.01 PRIMARY INSOMNIA: ICD-10-CM

## 2022-01-25 DIAGNOSIS — B37.31 CANDIDA VAGINITIS: ICD-10-CM

## 2022-01-25 DIAGNOSIS — E11.65 TYPE 2 DIABETES MELLITUS WITH HYPERGLYCEMIA, WITH LONG-TERM CURRENT USE OF INSULIN (HCC): ICD-10-CM

## 2022-01-25 DIAGNOSIS — E78.2 MIXED HYPERLIPIDEMIA: ICD-10-CM

## 2022-01-25 DIAGNOSIS — F51.04 PSYCHOPHYSIOLOGICAL INSOMNIA: ICD-10-CM

## 2022-01-25 DIAGNOSIS — E03.9 ACQUIRED HYPOTHYROIDISM: ICD-10-CM

## 2022-01-25 DIAGNOSIS — I10 PRIMARY HYPERTENSION: Primary | ICD-10-CM

## 2022-01-25 DIAGNOSIS — E89.0 POSTOPERATIVE HYPOTHYROIDISM: ICD-10-CM

## 2022-01-25 PROCEDURE — 99214 OFFICE O/P EST MOD 30 MIN: CPT | Performed by: FAMILY MEDICINE

## 2022-01-25 RX ORDER — LEVOTHYROXINE SODIUM 112 UG/1
112 TABLET ORAL
Qty: 90 TABLET | Refills: 1 | Status: SHIPPED | OUTPATIENT
Start: 2022-01-25

## 2022-01-25 RX ORDER — FLUCONAZOLE 150 MG/1
TABLET ORAL
Qty: 2 TABLET | Refills: 0 | Status: SHIPPED | OUTPATIENT
Start: 2022-01-25

## 2022-01-25 RX ORDER — ZOLPIDEM TARTRATE 5 MG/1
5 TABLET ORAL
Qty: 60 TABLET | Refills: 0 | Status: SHIPPED | OUTPATIENT
Start: 2022-01-25

## 2022-01-25 RX ORDER — LISINOPRIL 2.5 MG/1
2.5 TABLET ORAL DAILY
Qty: 90 TABLET | Refills: 1 | Status: SHIPPED | OUTPATIENT
Start: 2022-01-25

## 2022-01-25 NOTE — PROGRESS NOTES
1. Have you been to the ER, urgent care clinic since your last visit? Hospitalized since your last visit? No    2. Have you seen or consulted any other health care providers outside of the [address] [place] [address] since your last visit? Include any pap smears or colon screening.  No

## 2022-01-25 NOTE — PROGRESS NOTES
Deondre Zavala is a 77 y.o. female who presents with the following:  Chief Complaint   Patient presents with    Thumb Pain     right    Hypertension    Thyroid Problem    Anxiety     With sleep onset insomnia       Patient comes in with multiple problems today basically the first being pain in her thumb from osteoarthritis and the fact she is developing a trigger thumb on the right. The patient is diabetic and steroid injections tend to raise her blood sugar. I have suggested she ice the area keep the thumb splinted to rest the tendons and calm down the inflammation around them from the osteoarthritis with the ice applications 3-4 times daily. Patient also has hypertension and will need refills for medication for the lisinopril to help keep the blood pressure under control and protect her kidneys from her diabetes. The patient has hypothyroidism and needs a refill of her medication before they move to [place] to get her through until she sees her new doctor there. Patient states that her whole family has trouble sleeping and use various different sleep aids and she has been using Ambien for a long time and she basically admits she cannot cut her brain off at night and she sits in bed all night long for about which she has to do and becoming somewhat anxious about this which keeps her awake. We did talk about good sleep hygiene and suggested that she try this and I would cut her back on her Ambien until she sees her new doctor and hopefully here she will work with the patient on getting her on something that is not as habit forming or addictive. No Known Allergies    Current Outpatient Medications   Medication Sig    fluconazole (DIFLUCAN) 150 mg tablet FDA advises cautious prescribing of oral fluconazole in pregnancy.  zolpidem (AMBIEN) 5 mg tablet Take 1 Tablet by mouth nightly as needed for Sleep. Max Daily Amount: 5 mg.  Indications: difficulty falling asleep    lisinopriL (PRINIVIL, ZESTRIL) 2.5 mg tablet Take 1 Tablet by mouth daily.  levothyroxine (SYNTHROID) 112 mcg tablet Take 1 Tablet by mouth Daily (before breakfast).  insulin glargine (Basaglar KwikPen U-100 Insulin) 100 unit/mL (3 mL) inpn INJECT 44 UNITS UNDER THE SKIN AT BEDTIME    montelukast (SINGULAIR) 10 mg tablet Take 1 Tablet by mouth daily.  metFORMIN (GLUCOPHAGE) 1,000 mg tablet Take 1 Tablet by mouth two (2) times daily (with meals).  insulin aspart U-100 (NOVOLOG) 100 unit/mL (3 mL) inpn INJECT 3 TIMES A DAY 10 TO 15 MINUTES BEFORE MEALS,ACCORDING TO SLIDING SCALE 150-200 2 units 201-249 4 units 250-300 6 units Max daily dose is 18U    cyclobenzaprine (FLEXERIL) 10 mg tablet TAKE 1 TABLET BY MOUTH THREE (3) TIMES DAILY AS NEEDED FOR MUSCLE SPASM(S).  cetirizine (ZYRTEC) 10 mg tablet TAKE 1 TABLET BY MOUTH EVERY DAY    flash glucose sensor (FreeStyle Meredith 2 Sensor) kit Check blood sugar four times daily    atorvastatin (LIPITOR) 40 mg tablet TAKE 1 TABLET BY MOUTH EVERY DAY    glucose blood VI test strips (ASCENSIA AUTODISC VI, ONE TOUCH ULTRA TEST VI) strip Check blood sugar BID DX E11.9    lancets (OneTouch UltraSoft Lancets) misc Check blood sugar bid DX E11.9    ibuprofen (MOTRIN) 400 mg tablet Take 1 Tab by mouth two (2) times daily as needed for Pain.  Blood-Glucose Meter monitoring kit Check blood suagr before meals and at bedtime. E11.9  Needs One touch brand    aspirin 81 mg chewable tablet Take  by mouth.  Insulin Needles, Disposable, 32 gauge x 1/4\" ndle 1 (one) Injection as directed, to michael with levemir pen for 0 days    cholecalciferol (VITAMIN D3) (5000 Units/125 mcg) tab tablet take one tablet by oral route once a day     No current facility-administered medications for this visit.        Past Medical History:   Diagnosis Date    Diabetes ([address]     Double uterus     History of pulmonary embolus (PE) 1990's    x4- due to MTHFR deficiency    Menopause     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 (HCC)        Past Surgical History:   Procedure Laterality Date    HX APPENDECTOMY      HX BACK SURGERY      heniated disc    HX  SECTION      x2    HX DILATION AND CURETTAGE      HX THYROIDECTOMY      HX TUBAL LIGATION         Family History   Problem Relation Age of Onset    Diabetes Mother     Thyroid Disease Mother     High Cholesterol Mother     Melanoma Father     Other Sister         l body disease       Social History     Socioeconomic History    Marital status:    Tobacco Use    Smoking status: Former Smoker    Smokeless tobacco: Never Used   Substance and Sexual Activity    Alcohol use: Yes    Drug use: Never       Review of Systems   Constitutional: Negative for chills, fever, malaise/fatigue and weight loss. HENT: Negative for congestion, hearing loss, sore throat and tinnitus. Eyes: Negative for blurred vision, pain and discharge. Respiratory: Negative for cough, shortness of breath and wheezing. Cardiovascular: Negative for chest pain, palpitations, orthopnea, claudication and leg swelling. Gastrointestinal: Negative for abdominal pain, constipation and heartburn. Genitourinary: Negative for dysuria, frequency and urgency. Musculoskeletal: Negative for falls, joint pain and myalgias. Skin: Positive for rash (Occasional Candida in the genital area associated with her diabetes. ). Negative for itching. Neurological: Negative for dizziness, tingling, tremors and headaches. Endo/Heme/Allergies: Negative for environmental allergies and polydipsia. Psychiatric/Behavioral: Negative for depression and substance abuse. The patient is nervous/anxious. Visit Vitals  /76 (BP 1 Location: Left upper arm)   Pulse 82   Temp 97.1 °F (36.2 °C) (Temporal)   Resp 18   Ht 5' 9\" (1.753 m)   Wt 203 lb (92.1 kg)   SpO2 98%   BMI 29.98 kg/m²     Physical Exam  Vitals reviewed.    Constitutional:       General: She is not in acute distress. Appearance: Normal appearance. She is not ill-appearing. HENT:      Head: Normocephalic and atraumatic. Right Ear: Tympanic membrane, ear canal and external ear normal.      Left Ear: Tympanic membrane, ear canal and external ear normal.      Nose: Nose normal. No congestion or rhinorrhea. Mouth/Throat:      Mouth: Mucous membranes are moist.      Pharynx: No posterior oropharyngeal erythema. Eyes:      General:         Right eye: No discharge. Left eye: No discharge. Extraocular Movements: Extraocular movements intact. Conjunctiva/sclera: Conjunctivae normal.      Pupils: Pupils are equal, round, and reactive to light. Comments: Cornea anterior chamber and iris are normal.   Neck:      Trachea: No tracheal deviation. Cardiovascular:      Rate and Rhythm: Normal rate and regular rhythm. Pulses: Normal pulses. Heart sounds: Normal heart sounds. No murmur heard. No friction rub. No gallop. Pulmonary:      Effort: Pulmonary effort is normal. No respiratory distress. Breath sounds: Normal breath sounds. No wheezing or rhonchi. Chest:      Chest wall: No tenderness. Abdominal:      General: Bowel sounds are normal. There is no distension. Palpations: Abdomen is soft. There is no mass. Tenderness: There is no abdominal tenderness. There is no guarding or rebound. Musculoskeletal:         General: No tenderness or deformity. Cervical back: Normal range of motion and neck supple. Right lower leg: No edema. Left lower leg: No edema. Lymphadenopathy:      Cervical: No cervical adenopathy. Skin:     General: Skin is warm and dry. Coloration: Skin is not pale. Findings: No erythema or rash. Neurological:      General: No focal deficit present. Mental Status: She is alert and oriented to person, place, and time. Cranial Nerves: No cranial nerve deficit. Motor: No abnormal muscle tone. Deep Tendon Reflexes: Reflexes are normal and symmetric. Reflexes normal.      Comments: Cranial nerves II through XII are intact sensory and motor. Biceps triceps knee and ankle DTRs are normal and symmetrical.   Psychiatric:         Mood and Affect: Mood normal.         Behavior: Behavior normal.         Thought Content: Thought content normal.         Judgment: Judgment normal.           ICD-10-CM ICD-9-CM    1. Primary hypertension  I10 401.9 lisinopriL (PRINIVIL, ZESTRIL) 2.5 mg tablet   2. Candida vaginitis  B37.3 112.1 fluconazole (DIFLUCAN) 150 mg tablet   3. Primary insomnia  F51.01 307.42 zolpidem (AMBIEN) 5 mg tablet   4. Acquired hypothyroidism  E03.9 244.9 levothyroxine (SYNTHROID) 112 mcg tablet   5. Type 2 diabetes mellitus with hyperglycemia, with long-term current use of insulin ([facility])  E11.65 250.00     Z79.4 790.29      V58.67    6. Postoperative hypothyroidism  E89.0 244.0    7. Type 2 diabetes with nephropathy ([facility])  E11.21 250.40      583.81    8. Mixed hyperlipidemia  E78.2 272.2    9. Psychophysiological insomnia  F51.04 307.42        Orders Placed This Encounter    fluconazole (DIFLUCAN) 150 mg tablet     Sig: FDA advises cautious prescribing of oral fluconazole in pregnancy. Dispense:  2 Tablet     Refill:  0    zolpidem (AMBIEN) 5 mg tablet     Sig: Take 1 Tablet by mouth nightly as needed for Sleep. Max Daily Amount: 5 mg. Indications: difficulty falling asleep     Dispense:  60 Tablet     Refill:  0     .    lisinopriL (PRINIVIL, ZESTRIL) 2.5 mg tablet     Sig: Take 1 Tablet by mouth daily. Dispense:  90 Tablet     Refill:  1    levothyroxine (SYNTHROID) 112 mcg tablet     Sig: Take 1 Tablet by mouth Daily (before breakfast). Dispense:  90 Tablet     Refill:  1       Follow-up and Dispositions    · Return if symptoms worsen or fail to improve.          Nimesh Zhang MD

## 2022-03-02 RX ORDER — METFORMIN HYDROCHLORIDE 1000 MG/1
1000 TABLET ORAL 2 TIMES DAILY WITH MEALS
Qty: 180 TABLET | Refills: 2 | Status: SHIPPED | OUTPATIENT
Start: 2022-03-02

## 2022-03-18 PROBLEM — J32.9 CHRONIC SINUSITIS: Status: ACTIVE | Noted: 2020-09-25

## 2022-03-18 PROBLEM — E78.2 MIXED HYPERLIPIDEMIA: Status: ACTIVE | Noted: 2020-10-01

## 2022-03-18 PROBLEM — R61 DIAPHORESIS: Status: ACTIVE | Noted: 2020-09-25

## 2022-03-19 PROBLEM — E72.12 MTHFR (METHYLENE THF REDUCTASE) DEFICIENCY AND HOMOCYSTINURIA (HCC): Status: ACTIVE | Noted: 2020-05-18

## 2022-03-19 PROBLEM — M75.111 INCOMPLETE TEAR OF RIGHT ROTATOR CUFF: Status: ACTIVE | Noted: 2020-05-18

## 2022-03-19 PROBLEM — F51.04 PSYCHOPHYSIOLOGICAL INSOMNIA: Status: ACTIVE | Noted: 2021-07-29

## 2022-03-19 PROBLEM — Z86.711 HISTORY OF PULMONARY EMBOLISM: Status: ACTIVE | Noted: 2020-09-25

## 2022-03-19 PROBLEM — E11.65 TYPE 2 DIABETES MELLITUS WITH HYPERGLYCEMIA, WITH LONG-TERM CURRENT USE OF INSULIN (HCC): Status: ACTIVE | Noted: 2020-05-18

## 2022-03-19 PROBLEM — Z79.4 TYPE 2 DIABETES MELLITUS WITH HYPERGLYCEMIA, WITH LONG-TERM CURRENT USE OF INSULIN (HCC): Status: ACTIVE | Noted: 2020-05-18

## 2022-03-19 PROBLEM — M50.30 DDD (DEGENERATIVE DISC DISEASE), CERVICAL: Status: ACTIVE | Noted: 2021-01-27

## 2022-03-19 PROBLEM — M85.859 OSTEOPENIA OF NECK OF FEMUR: Status: ACTIVE | Noted: 2021-02-03

## 2022-03-19 PROBLEM — E72.11 MTHFR (METHYLENE THF REDUCTASE) DEFICIENCY AND HOMOCYSTINURIA (HCC): Status: ACTIVE | Noted: 2020-05-18

## 2022-03-19 PROBLEM — E11.21 TYPE 2 DIABETES WITH NEPHROPATHY (HCC): Status: ACTIVE | Noted: 2020-12-17

## 2022-03-19 PROBLEM — L98.9 SKIN LESION OF FACE: Status: ACTIVE | Noted: 2020-09-25

## 2022-03-19 PROBLEM — R80.9 MICROALBUMINURIA: Status: ACTIVE | Noted: 2020-10-01

## 2022-03-19 PROBLEM — F51.01 PRIMARY INSOMNIA: Status: ACTIVE | Noted: 2020-05-18

## 2022-03-20 PROBLEM — E89.0 POSTOPERATIVE HYPOTHYROIDISM: Status: ACTIVE | Noted: 2020-05-18

## 2023-05-22 RX ORDER — LEVOTHYROXINE SODIUM 112 UG/1
112 TABLET ORAL
COMMUNITY
Start: 2022-01-25

## 2023-05-22 RX ORDER — INSULIN GLARGINE 100 [IU]/ML
INJECTION, SOLUTION SUBCUTANEOUS
COMMUNITY
Start: 2022-01-11

## 2023-05-22 RX ORDER — FLUCONAZOLE 150 MG/1
TABLET ORAL
COMMUNITY
Start: 2022-01-25

## 2023-05-22 RX ORDER — IBUPROFEN 400 MG/1
400 TABLET ORAL 2 TIMES DAILY PRN
COMMUNITY
Start: 2020-09-25

## 2023-05-22 RX ORDER — LISINOPRIL 2.5 MG/1
2.5 TABLET ORAL DAILY
COMMUNITY
Start: 2022-01-25

## 2023-05-22 RX ORDER — MONTELUKAST SODIUM 10 MG/1
10 TABLET ORAL DAILY
COMMUNITY
Start: 2021-12-18

## 2023-05-22 RX ORDER — ZOLPIDEM TARTRATE 5 MG/1
5 TABLET ORAL
COMMUNITY
Start: 2022-01-25

## 2023-05-22 RX ORDER — ATORVASTATIN CALCIUM 40 MG/1
1 TABLET, FILM COATED ORAL DAILY
COMMUNITY
Start: 2021-07-14

## 2023-05-22 RX ORDER — CETIRIZINE HYDROCHLORIDE 10 MG/1
1 TABLET ORAL DAILY
COMMUNITY
Start: 2021-10-05

## 2023-05-22 RX ORDER — ASPIRIN 81 MG/1
TABLET, CHEWABLE ORAL
COMMUNITY

## 2023-05-22 RX ORDER — CYCLOBENZAPRINE HCL 10 MG
TABLET ORAL
COMMUNITY
Start: 2021-10-20

## 2023-07-26 ENCOUNTER — APPOINTMENT (RX ONLY)
Dept: URBAN - METROPOLITAN AREA CLINIC 332 | Facility: CLINIC | Age: 68
Setting detail: DERMATOLOGY
End: 2023-07-26

## 2023-07-26 DIAGNOSIS — D18.0 HEMANGIOMA: ICD-10-CM

## 2023-07-26 DIAGNOSIS — L81.4 OTHER MELANIN HYPERPIGMENTATION: ICD-10-CM

## 2023-07-26 DIAGNOSIS — D22 MELANOCYTIC NEVI: ICD-10-CM

## 2023-07-26 DIAGNOSIS — L56.8 OTHER SPECIFIED ACUTE SKIN CHANGES DUE TO ULTRAVIOLET RADIATION: ICD-10-CM

## 2023-07-26 DIAGNOSIS — L82.1 OTHER SEBORRHEIC KERATOSIS: ICD-10-CM

## 2023-07-26 PROBLEM — D22.5 MELANOCYTIC NEVI OF TRUNK: Status: ACTIVE | Noted: 2023-07-26

## 2023-07-26 PROBLEM — D18.01 HEMANGIOMA OF SKIN AND SUBCUTANEOUS TISSUE: Status: ACTIVE | Noted: 2023-07-26

## 2023-07-26 PROCEDURE — ? COUNSELING

## 2023-07-26 PROCEDURE — 99203 OFFICE O/P NEW LOW 30 MIN: CPT

## 2023-07-26 PROCEDURE — ? TREATMENT REGIMEN

## 2023-07-26 ASSESSMENT — LOCATION DETAILED DESCRIPTION DERM
LOCATION DETAILED: RIGHT POSTERIOR SHOULDER
LOCATION DETAILED: UPPER STERNUM
LOCATION DETAILED: RIGHT INFERIOR UPPER BACK
LOCATION DETAILED: LEFT POSTERIOR SHOULDER
LOCATION DETAILED: RIGHT SUPERIOR MEDIAL MIDBACK
LOCATION DETAILED: LEFT MEDIAL UPPER BACK

## 2023-07-26 ASSESSMENT — LOCATION SIMPLE DESCRIPTION DERM
LOCATION SIMPLE: LEFT SHOULDER
LOCATION SIMPLE: CHEST
LOCATION SIMPLE: RIGHT UPPER BACK
LOCATION SIMPLE: LEFT UPPER BACK
LOCATION SIMPLE: RIGHT SHOULDER
LOCATION SIMPLE: RIGHT LOWER BACK

## 2023-07-26 ASSESSMENT — LOCATION ZONE DERM
LOCATION ZONE: TRUNK
LOCATION ZONE: ARM

## 2023-07-26 NOTE — HPI: EVALUATION OF SKIN LESION(S)
What Type Of Note Output Would You Prefer (Optional)?: Standard Output
Hpi Title: Evaluation of Skin Lesions
Yes

## 2023-10-18 ENCOUNTER — APPOINTMENT (RX ONLY)
Dept: URBAN - METROPOLITAN AREA CLINIC 332 | Facility: CLINIC | Age: 68
Setting detail: DERMATOLOGY
End: 2023-10-18

## 2023-10-18 DIAGNOSIS — D18.0 HEMANGIOMA: ICD-10-CM

## 2023-10-18 PROBLEM — D18.01 HEMANGIOMA OF SKIN AND SUBCUTANEOUS TISSUE: Status: ACTIVE | Noted: 2023-10-18

## 2023-10-18 PROCEDURE — ? COUNSELING

## 2023-10-18 PROCEDURE — ? ELECTRODESICCATION (COSMETIC)

## 2023-10-18 ASSESSMENT — LOCATION DETAILED DESCRIPTION DERM
LOCATION DETAILED: RIGHT PROXIMAL DORSAL FOREARM
LOCATION DETAILED: RIGHT MEDIAL FOREHEAD
LOCATION DETAILED: LEFT MEDIAL BREAST 10-11:00 REGION
LOCATION DETAILED: LEFT LATERAL SUPERIOR CHEST
LOCATION DETAILED: LEFT INFERIOR MEDIAL FOREHEAD
LOCATION DETAILED: LEFT FOREHEAD

## 2023-10-18 ASSESSMENT — LOCATION SIMPLE DESCRIPTION DERM
LOCATION SIMPLE: LEFT BREAST
LOCATION SIMPLE: RIGHT FOREHEAD
LOCATION SIMPLE: CHEST
LOCATION SIMPLE: LEFT FOREHEAD
LOCATION SIMPLE: RIGHT FOREARM

## 2023-10-18 ASSESSMENT — LOCATION ZONE DERM
LOCATION ZONE: TRUNK
LOCATION ZONE: ARM
LOCATION ZONE: FACE

## 2023-10-18 NOTE — PROCEDURE: ELECTRODESICCATION (COSMETIC)
Consent: The patient's consent was obtained including but not limited to risks of crusting, scabbing, blistering, scarring, darker or lighter pigmentary change, recurrence, incomplete removal and infection.
Anesthesia Type: 1% lidocaine with epinephrine
Post-Care Instructions: I reviewed with the patient in detail post-care instructions. Patient is to wear sunprotection, and avoid picking at any of the treated lesions. Pt may apply Vaseline to crusted or scabbing areas
Detail Level: Zone
Price (Use Numbers Only, No Special Characters Or $): 100
Felix: 3

## 2024-07-29 ENCOUNTER — APPOINTMENT (RX ONLY)
Dept: URBAN - METROPOLITAN AREA CLINIC 332 | Facility: CLINIC | Age: 69
Setting detail: DERMATOLOGY
End: 2024-07-29

## 2024-07-29 DIAGNOSIS — D18.0 HEMANGIOMA: ICD-10-CM

## 2024-07-29 DIAGNOSIS — L65.0 TELOGEN EFFLUVIUM: ICD-10-CM | Status: INADEQUATELY CONTROLLED

## 2024-07-29 DIAGNOSIS — L81.4 OTHER MELANIN HYPERPIGMENTATION: ICD-10-CM

## 2024-07-29 DIAGNOSIS — L82.1 OTHER SEBORRHEIC KERATOSIS: ICD-10-CM

## 2024-07-29 DIAGNOSIS — D22 MELANOCYTIC NEVI: ICD-10-CM

## 2024-07-29 PROBLEM — D22.5 MELANOCYTIC NEVI OF TRUNK: Status: ACTIVE | Noted: 2024-07-29

## 2024-07-29 PROBLEM — D18.01 HEMANGIOMA OF SKIN AND SUBCUTANEOUS TISSUE: Status: ACTIVE | Noted: 2024-07-29

## 2024-07-29 PROCEDURE — ? PRESCRIPTION MEDICATION MANAGEMENT

## 2024-07-29 PROCEDURE — ? COUNSELING

## 2024-07-29 PROCEDURE — 99214 OFFICE O/P EST MOD 30 MIN: CPT

## 2024-07-29 PROCEDURE — ? TREATMENT REGIMEN

## 2024-07-29 PROCEDURE — ? FULL BODY SKIN EXAM

## 2024-07-29 ASSESSMENT — LOCATION DETAILED DESCRIPTION DERM
LOCATION DETAILED: UPPER STERNUM
LOCATION DETAILED: LEFT MEDIAL UPPER BACK
LOCATION DETAILED: RIGHT INFERIOR UPPER BACK
LOCATION DETAILED: POSTERIOR MID-PARIETAL SCALP
LOCATION DETAILED: RIGHT SUPERIOR MEDIAL MIDBACK

## 2024-07-29 ASSESSMENT — LOCATION SIMPLE DESCRIPTION DERM
LOCATION SIMPLE: RIGHT UPPER BACK
LOCATION SIMPLE: POSTERIOR SCALP
LOCATION SIMPLE: CHEST
LOCATION SIMPLE: LEFT UPPER BACK
LOCATION SIMPLE: RIGHT LOWER BACK

## 2024-07-29 ASSESSMENT — LOCATION ZONE DERM
LOCATION ZONE: TRUNK
LOCATION ZONE: SCALP

## 2024-07-29 NOTE — PROCEDURE: PRESCRIPTION MEDICATION MANAGEMENT
Detail Level: Zone
Plan: If worsens can call for reevaluation and possible biopsy
Initiate Treatment: Minoxidil 5% solution
Render In Strict Bullet Format?: No

## 2024-07-29 NOTE — HPI: EVALUATION OF SKIN LESION(S)
What Type Of Note Output Would You Prefer (Optional)?: Standard Output
Hpi Title: Evaluation of Skin Lesions
Family Member: Father
Additional History: Pt has been experiencing hair loss for about a year. States that it’s generalized on scalp. Pt has tried shampoo with biotin and collagen. Pt would also like to discuss a skin tag under her left arm.